# Patient Record
Sex: MALE | Race: WHITE | NOT HISPANIC OR LATINO | Employment: UNEMPLOYED | URBAN - METROPOLITAN AREA
[De-identification: names, ages, dates, MRNs, and addresses within clinical notes are randomized per-mention and may not be internally consistent; named-entity substitution may affect disease eponyms.]

---

## 2018-06-12 ENCOUNTER — HOSPITAL ENCOUNTER (EMERGENCY)
Facility: CLINIC | Age: 28
Discharge: HOME | End: 2018-06-12
Attending: EMERGENCY MEDICINE | Admitting: EMERGENCY MEDICINE
Payer: COMMERCIAL

## 2018-06-12 ENCOUNTER — APPOINTMENT (OUTPATIENT)
Dept: GENERAL RADIOLOGY | Facility: CLINIC | Age: 28
End: 2018-06-12
Attending: EMERGENCY MEDICINE
Payer: COMMERCIAL

## 2018-06-12 VITALS
WEIGHT: 260 LBS | RESPIRATION RATE: 18 BRPM | DIASTOLIC BLOOD PRESSURE: 81 MMHG | SYSTOLIC BLOOD PRESSURE: 152 MMHG | OXYGEN SATURATION: 98 % | BODY MASS INDEX: 35.21 KG/M2 | TEMPERATURE: 98.7 F | HEIGHT: 72 IN

## 2018-06-12 DIAGNOSIS — R07.9 CHEST PAIN, UNSPECIFIED TYPE: ICD-10-CM

## 2018-06-12 DIAGNOSIS — F41.9 ANXIETY: ICD-10-CM

## 2018-06-12 LAB
ANION GAP SERPL CALCULATED.3IONS-SCNC: 6 MMOL/L (ref 3–14)
BASOPHILS # BLD AUTO: 0.1 10E9/L (ref 0–0.2)
BASOPHILS NFR BLD AUTO: 1.4 %
BUN SERPL-MCNC: 15 MG/DL (ref 7–30)
CALCIUM SERPL-MCNC: 8.8 MG/DL (ref 8.5–10.1)
CHLORIDE SERPL-SCNC: 105 MMOL/L (ref 94–109)
CO2 SERPL-SCNC: 25 MMOL/L (ref 20–32)
CREAT SERPL-MCNC: 0.97 MG/DL (ref 0.66–1.25)
D DIMER PPP FEU-MCNC: 0.3 UG/ML FEU (ref 0–0.5)
DIFFERENTIAL METHOD BLD: NORMAL
EOSINOPHIL # BLD AUTO: 0.2 10E9/L (ref 0–0.7)
EOSINOPHIL NFR BLD AUTO: 2.2 %
ERYTHROCYTE [DISTWIDTH] IN BLOOD BY AUTOMATED COUNT: 13.1 % (ref 10–15)
GFR SERPL CREATININE-BSD FRML MDRD: >90 ML/MIN/1.7M2
GLUCOSE SERPL-MCNC: 98 MG/DL (ref 70–99)
HCT VFR BLD AUTO: 45.3 % (ref 40–53)
HGB BLD-MCNC: 15.3 G/DL (ref 13.3–17.7)
IMM GRANULOCYTES # BLD: 0.1 10E9/L (ref 0–0.4)
IMM GRANULOCYTES NFR BLD: 0.6 %
LYMPHOCYTES # BLD AUTO: 2.1 10E9/L (ref 0.8–5.3)
LYMPHOCYTES NFR BLD AUTO: 21.6 %
MCH RBC QN AUTO: 30.5 PG (ref 26.5–33)
MCHC RBC AUTO-ENTMCNC: 33.8 G/DL (ref 31.5–36.5)
MCV RBC AUTO: 90 FL (ref 78–100)
MONOCYTES # BLD AUTO: 0.7 10E9/L (ref 0–1.3)
MONOCYTES NFR BLD AUTO: 6.9 %
NEUTROPHILS # BLD AUTO: 6.4 10E9/L (ref 1.6–8.3)
NEUTROPHILS NFR BLD AUTO: 67.3 %
NRBC # BLD AUTO: 0 10*3/UL
NRBC BLD AUTO-RTO: 0 /100
PLATELET # BLD AUTO: 293 10E9/L (ref 150–450)
POTASSIUM SERPL-SCNC: 4.9 MMOL/L (ref 3.4–5.3)
RBC # BLD AUTO: 5.01 10E12/L (ref 4.4–5.9)
SODIUM SERPL-SCNC: 136 MMOL/L (ref 133–144)
TROPONIN I SERPL-MCNC: <0.015 UG/L (ref 0–0.04)
WBC # BLD AUTO: 9.5 10E9/L (ref 4–11)

## 2018-06-12 PROCEDURE — 25000128 H RX IP 250 OP 636: Performed by: EMERGENCY MEDICINE

## 2018-06-12 PROCEDURE — 96361 HYDRATE IV INFUSION ADD-ON: CPT

## 2018-06-12 PROCEDURE — 99285 EMERGENCY DEPT VISIT HI MDM: CPT | Mod: 25

## 2018-06-12 PROCEDURE — 96374 THER/PROPH/DIAG INJ IV PUSH: CPT

## 2018-06-12 PROCEDURE — 85025 COMPLETE CBC W/AUTO DIFF WBC: CPT | Performed by: EMERGENCY MEDICINE

## 2018-06-12 PROCEDURE — 80048 BASIC METABOLIC PNL TOTAL CA: CPT | Performed by: EMERGENCY MEDICINE

## 2018-06-12 PROCEDURE — 93005 ELECTROCARDIOGRAM TRACING: CPT

## 2018-06-12 PROCEDURE — 85379 FIBRIN DEGRADATION QUANT: CPT | Performed by: EMERGENCY MEDICINE

## 2018-06-12 PROCEDURE — 84484 ASSAY OF TROPONIN QUANT: CPT | Performed by: EMERGENCY MEDICINE

## 2018-06-12 PROCEDURE — 71046 X-RAY EXAM CHEST 2 VIEWS: CPT

## 2018-06-12 RX ORDER — KETOROLAC TROMETHAMINE 30 MG/ML
30 INJECTION, SOLUTION INTRAMUSCULAR; INTRAVENOUS ONCE
Status: COMPLETED | OUTPATIENT
Start: 2018-06-12 | End: 2018-06-12

## 2018-06-12 RX ORDER — ALPRAZOLAM 0.5 MG
0.5 TABLET ORAL 3 TIMES DAILY PRN
Qty: 10 TABLET | Refills: 0 | Status: ON HOLD | OUTPATIENT
Start: 2018-06-12 | End: 2022-06-15

## 2018-06-12 RX ADMIN — SODIUM CHLORIDE 500 ML: 9 INJECTION, SOLUTION INTRAVENOUS at 15:53

## 2018-06-12 RX ADMIN — KETOROLAC TROMETHAMINE 30 MG: 30 INJECTION, SOLUTION INTRAMUSCULAR at 15:53

## 2018-06-12 ASSESSMENT — ENCOUNTER SYMPTOMS
PALPITATIONS: 1
SLEEP DISTURBANCE: 1
COUGH: 1

## 2018-06-12 NOTE — ED TRIAGE NOTES
Pt c/o CP intemittent for 1-2 weeks but worsening over past few days. Increased with deep inhalation. Denies recent travel.  Anxiety and diaphoresis during episodes. Unable to sleep due to pain. ABC in tact. A/OX4

## 2018-06-12 NOTE — ED AVS SNAPSHOT
[facility] Emergency Department    [address]    [facility] [address]    Phone:  [phone]    Fax:  [phone]                                       Marshall Yoon   MRN: [id number]    Department:  [facility] Emergency Department   Date of Visit:  6/12/2018           After Visit Summary Signature Page     I have received my discharge instructions, and my questions have been answered. I have discussed any challenges I see with this plan with the nurse or doctor.    ..........................................................................................................................................  Patient/Patient Representative Signature      ..........................................................................................................................................  Patient Representative Print Name and Relationship to Patient    ..................................................               ................................................  Date                                            Time    ..........................................................................................................................................  Reviewed by Signature/Title    ...................................................              ..............................................  Date                                                            Time

## 2018-06-12 NOTE — ED AVS SNAPSHOT
[facility] Emergency Department    [address] [place] [address]    Phone:  [phone]    Fax:  [phone]                                       Marshall Yoon   MRN: [id number]    Department:  [facility] Emergency Department   Date of Visit:  6/12/2018           Patient Information     Date Of Birth          1990        Your diagnoses for this visit were:     Chest pain, unspecified type     Anxiety        You were seen by Chelo Espinosa MD.      Follow-up Information     Follow up with [facility] (OP).    Contact information:    [address] [place] [address]  [phone]      Discharge References/Attachments     ANXIETY REACTION (ENGLISH)    CHEST PAIN, NONCARDIAC  (ENGLISH)      24 Hour Appointment Hotline       To make an appointment at any [facility] clinic, call [phone] ([phone]). If you don't have a family doctor or clinic, we will help you find one. [facility] clinics are conveniently located to serve the needs of you and your family.             Review of your medicines      START taking        Dose / Directions Last dose taken    ALPRAZolam 0.5 MG tablet   Commonly known as:  XANAX   Dose:  0.5 mg   Quantity:  10 tablet        Take 1 tablet (0.5 mg) by mouth 3 times daily as needed for anxiety   Refills:  0                Prescriptions were sent or printed at these locations (1 Prescription)                   Other Prescriptions                Printed at Department/Unit printer (1 of 1)         ALPRAZolam (XANAX) 0.5 MG tablet                Procedures and tests performed during your visit     Basic metabolic panel    CBC with platelets differential    D dimer quantitative    EKG 12 lead    Peripheral IV catheter    Troponin I    XR Chest 2 Views      Orders Needing Specimen Collection     None      Pending Results     Date and Time Order Name Status Description    6/12/2018 1536 EKG 12 lead Preliminary              Pending Culture Results     No orders found from 6/10/2018 to 6/13/2018.            Pending Results Instructions     If you had any lab results that were not finalized at the time of your Discharge, you can call the ED Lab Result RN at [phone]. You will be contacted by this team for any positive Lab results or changes in treatment. The nurses are available 7 days a week from 10A to 6:30P.  You can leave a message 24 hours per day and they will return your call.        Test Results From Your Hospital Stay        6/12/2018  3:47 PM      Component Results     Component Value Ref Range & Units Status    WBC 9.5 4.0 - 11.0 10e9/L Final    RBC Count 5.01 4.4 - 5.9 10e12/L Final    Hemoglobin 15.3 13.3 - 17.7 g/dL Final    Hematocrit 45.3 40.0 - 53.0 % Final    MCV 90 78 - 100 fl Final    MCH 30.5 26.5 - 33.0 pg Final    MCHC 33.8 31.5 - 36.5 g/dL Final    RDW 13.1 10.0 - 15.0 % Final    Platelet Count 293 150 - 450 10e9/L Final    Diff Method Automated Method  Final    % Neutrophils 67.3 % Final    % Lymphocytes 21.6 % Final    % Monocytes 6.9 % Final    % Eosinophils 2.2 % Final    % Basophils 1.4 % Final    % Immature Granulocytes 0.6 % Final    Nucleated RBCs 0 0 /100 Final    Absolute Neutrophil 6.4 1.6 - 8.3 10e9/L Final    Absolute Lymphocytes 2.1 0.8 - 5.3 10e9/L Final    Absolute Monocytes 0.7 0.0 - 1.3 10e9/L Final    Absolute Eosinophils 0.2 0.0 - 0.7 10e9/L Final    Absolute Basophils 0.1 0.0 - 0.2 10e9/L Final    Abs Immature Granulocytes 0.1 0 - 0.4 10e9/L Final    Absolute Nucleated RBC 0.0  Final         6/12/2018  4:05 PM      Component Results     Component Value Ref Range & Units Status    Sodium 136 133 - 144 mmol/L Final    Potassium 4.9 3.4 - 5.3 mmol/L Final    Chloride 105 94 - 109 mmol/L Final    Carbon Dioxide 25 20 - 32 mmol/L Final    Anion Gap 6 3 - 14 mmol/L Final    Glucose 98 70 - 99 mg/dL Final    Urea Nitrogen 15 7 - 30 mg/dL Final    Creatinine 0.97 0.66 - 1.25 mg/dL Final     GFR Estimate >90 >60 mL/min/1.7m2 Final    Non  GFR Calc    GFR Estimate If Black >90 >60 mL/min/1.7m2 Final    African American GFR Calc    Calcium 8.8 8.5 - 10.1 mg/dL Final         6/12/2018  3:59 PM      Component Results     Component Value Ref Range & Units Status    D Dimer 0.3 0.0 - 0.50 ug/ml FEU Final    This D-dimer assay is intended for use in conjunction with a clinical pretest   probability assessment model to exclude pulmonary embolism (PE) and deep   venous thrombosis (DVT) in outpatients suspected of PE or DVT. The cut-off   value is 0.5 ug/mL FEU.           6/12/2018  4:05 PM      Component Results     Component Value Ref Range & Units Status    Troponin I ES <0.015 0.000 - 0.045 ug/L Final    The 99th percentile for upper reference range is 0.045 ug/L.  Troponin values   in the range of 0.045 - 0.120 ug/L may be associated with risks of adverse   clinical events.           6/12/2018  4:39 PM      Narrative     CHEST TWO VIEWS  6/12/2018 4:33 PM     HISTORY: chest pain;     COMPARISON: None.        Impression     IMPRESSION:   Heart and pulmonary vessels within normal limits. Lungs clear. No  pleural effusion.    LING BAEZ MD                Clinical Quality Measure: Blood Pressure Screening     Your blood pressure was checked while you were in the emergency department today. The last reading we obtained was  BP: (!) 135/102 . Please read the guidelines below about what these numbers mean and what you should do about them.  If your systolic blood pressure (the top number) is less than 120 and your diastolic blood pressure (the bottom number) is less than 80, then your blood pressure is normal. There is nothing more that you need to do about it.  If your systolic blood pressure (the top number) is 120-139 or your diastolic blood pressure (the bottom number) is 80-89, your blood pressure may be higher than it should be. You should have your blood pressure rechecked within a year  "by a primary care provider.  If your systolic blood pressure (the top number) is 140 or greater or your diastolic blood pressure (the bottom number) is 90 or greater, you may have high blood pressure. High blood pressure is treatable, but if left untreated over time it can put you at risk for heart attack, stroke, or kidney failure. You should have your blood pressure rechecked by a primary care provider within the next 4 weeks.  If your provider in the emergency department today gave you specific instructions to follow-up with your doctor or provider even sooner than that, you should follow that instruction and not wait for up to 4 weeks for your follow-up visit.        Thank you for choosing [facility]       Thank you for choosing [facility] for your care. Our goal is always to provide you with excellent care. Hearing back from our patients is one way we can continue to improve our services. Please take a few minutes to complete the written survey that you may receive in the mail after you visit with us. Thank you!        [employer] Information     [employer] lets you send messages to your doctor, view your test results, renew your prescriptions, schedule appointments and more. To sign up, go to [email or web address] . Click on \"Log in\" on the left side of the screen, which will take you to the Welcome page. Then click on \"Sign up Now\" on the right side of the page.     You will be asked to enter the access code listed below, as well as some personal information. Please follow the directions to create your username and password.     Your access code is: [email or web address]  Expires: 9/10/2018  5:35 PM     Your access code will  in 90 days. If you need help or a new code, please call your [facility] clinic or [phone].        Care EveryWhere ID     This is your Care EveryWhere ID. This could be used by other organizations to access your [facility] medical records  [id number]        Equal Access to Services     PRINCE BELLE " AH: Sara Echevarria, waclaire lualmaadaha, qajozefta kasusan vergara. So [facility] [phone].    ATENCIÓN: Si habla español, tiene a hutson disposición servicios gratuitos de asistencia lingüística. Llame al [phone].    We comply with applicable federal civil rights laws and [place] laws. We do not discriminate on the basis of race, color, national origin, age, disability, sex, sexual orientation, or gender identity.            After Visit Summary       This is your record. Keep this with you and show to your community pharmacist(s) and doctor(s) at your next visit.

## 2018-06-12 NOTE — ED PROVIDER NOTES
History     Chief Complaint:  Chest Pain    HPI   Marshall Yoon is a 27 year old male with anxiety who presents with left sided chest pain that sometimes moves to the right side. The patient states that he has had chest pain for about a week. The patient states the pain is currently a 7. The patient says that taking deep breath that it makes the pain worse. The patient says he does not have a persistent cough, but when he does cough it seems to increase the pain. He also notes pain when the left side of the chest is palpated.The patient notes that he did not sleep well last night as he had heart palpitations.The patient states that he has no heavy lifting, no recent travel, and no surgery. He does not that he had a procedure on his arm after it became infected after drug use. Of note: the patient states that his dad has a stint.     Allergies:  No Known Drug Allergies    Medications:    Medications reviewed. No current medications.     Past Medical History:    Anxiety  Recreational drug use     Past Surgical History:    Right arm I and D    Family History:    Family history reviewed. No pertinent family history.     Social History:  Smoking Status: Current Every day smoker    Packs/day: 0.25  Smokeless Tobacco: Never Used  Alcohol Use: No       Review of Systems   Respiratory: Positive for cough.    Cardiovascular: Positive for chest pain and palpitations.   Psychiatric/Behavioral: Positive for sleep disturbance.        Anxiety.    All other systems reviewed and are negative.  Pt c/o CP intemittent for 1-2 weeks but worsening over past few days. Increased with deep inhalation. Denies recent travel.  Anxiety and diaphoresis during episodes. Unable to sleep due to pain  Physical Exam     Patient Vitals for the past 24 hrs:   BP Temp Temp src Heart Rate Resp SpO2 Height Weight   06/12/18 1615 (!) 135/102 - - 89 - 97 % - -   06/12/18 1600 (!) 142/97 - - 86 - 99 % - -   06/12/18 1545 (!) 147/94 - - 92 - 97 % - -    06/12/18 1533 (!) 137/126 - - - - - - -   06/12/18 1530 - - - 91 - 100 % - -   06/12/18 1529 - 98.7  F (37.1  C) Oral 96 20 100 % 1.829 m (6') 117.9 kg (260 lb)       Physical Exam  GEN: patient smiling, no distress  HEAD: atraumatic, normocephalic  EYES: pupils reactive,  conjunctivae normal  ENT: TMs flat and white bilaterally, oropharynx normal with no erythema or exudate, mucus membranes moist  NECK: no cervical LAD  RESPIRATORY: no tachypnea, breath sounds clear to auscultation (no rales, wheezes, rhonchi), chest wall nontender, normal phonation  CVS: normal S1/S2, no murmurs/rubs/gallops  ABDOMEN: soft, nontender, no masses or organomegaly, no rebound, positive bowel sounds  BACK: no costovertebral angle tenderness  EXTREMITIES: intact pulses x 4, full range of motion at joints, no edema, negative Pieter's sign  MUSCULOSKELETAL: no deformities  SKIN: warm and dry  NEURO: GCS 15, cranial nerves intact.  Motor- moves all 4 extremities with 5/5 strength.  Sensation- intact.  Coordination- ambulatory.  Overall symmetrical exam  HEME: no bruising or petechiae/contusions  LYMPH: no lymphadenopathy    Emergency Department Course     ECG:  ECG taken at 1530, ECG read at 1530  Normal sinus rhythm  Normal ECG  Rate 97 bpm. KY interval 162 ms. QRS duration 82 ms. QT/QTc 338/429 ms. P-R-T axes 44 -8 24.  Normal axis.  No STEMI    Imaging:  Radiology findings were communicated with the patient who voiced understanding of the findings.    XR Chest   Heart and pulmonary vessels within normal limits. Lungs clear. No  pleural effusion.  LING BAEZ MD  Reading per radiology    Laboratory:  Laboratory findings were communicated with the patient  who voiced understanding of the findings.    CBC: WBC 5.01, HGB 15.3,   BMP:  WNL (Creatinine 0.97)  D dimer: 0.3 (normal)  Troponin (Collected 1536): <0.015    Interventions:  1553  mL IV  1553 Toradol 30 mg IV  He[place]ock  Cardiac/Sp02 monitoring    Emergency Department  Course:    1525 Nursing notes and vitals reviewed.    1530 I performed an exam of the patient as documented above.     1536 IV was inserted and blood was drawn for laboratory testing, results above.    1628 The patient was sent for a XR Chest while in the emergency department, results above.      1716  I rechecked on the patient and updated the patient on the results.     1803 I personally reviewed the laboratory and imaging results with the patient and answered all related questions prior to discharge.    /81  Temp 98.7  F (37.1  C) (Oral)  Resp 18  Ht 1.829 m (6')  Wt 117.9 kg (260 lb)  SpO2 98%  BMI 35.26 kg/m2  Discussed results with patient.  Gave patient copies of results (applicable labs, CT scans and/or ultrasound).  Answered questions.  Asked patient to followup with PCP.       Impression & Plan      Medical Decision Making:  Marshall Yoon is a 27 year old male who presents to the emergency department today for evaluation of chest pain. The patient was brought back immediately in case this was a cardiac issue.  The patient does not hve a history of cardiac disorder.  Differential includes, but is not limited to, ACS, PE, pneumonia, pneumothorax, aortic dissection. esophageal rupture, pericarditis, GERD, or musculoskeletal chest wall pain. An EKG was obtained and demonstrates sinus rhythm. There are no concerning acute ischemic changes.  EKG did not show ST elevation myocardial infarction. Chest x-ray showed no evidence of an infiltrate or a pneumothorax. There was no widened mediastinum to suggest aortic dissection (ie no evidence of an acute cardiopulmonary process)  The initial D dimer was negative.  Initial troponin does not show any sign of ischemia.    Patient was placed on cardiac monitor and given oxygen by nasal cannula.     The patient feels completelt better after receiving toradol IV. In the past the patient has been on buspar and gabapentin for anxiety, but he does not have a  primary care physician and did not like the effects of these medicines. The plan at this point is to give him xanax and have him follow up with a primary care physician.     Given the patient's unremarkable imaging- I feel that pneumonia, pneumothorax, aortic dissection and PE are less likely. The patient's EKG is unremarkable.  I feel that ACS is less likely with a negative troponin x 1. Overall, the workup here is relativity unremarkable. Given that the patient appears well with an unremarkable workup, I do feel that the patient can be discharged to home.      Plan for followup with PCP.      Diagnosis:    ICD-10-CM    1. Chest pain, unspecified type R07.9    2. Anxiety F41.9      Disposition:   The patient is discharged to home.    Discharge Medications:  New Prescriptions    ALPRAZOLAM (XANAX) 0.5 MG TABLET    Take 1 tablet (0.5 mg) by mouth 3 times daily as needed for anxiety     Scribe Disclosure:  I, Anna Westion, am serving as a scribe at 3:37 PM on 6/12/2018 to document services personally performed by Chelo Espinosa MD based on my observations and the provider's statements to me.  [facility] EMERGENCY DEPARTMENT       Chelo Espinosa MD  06/13/18 1248       Chelo Espinosa MD  06/13/18 6951

## 2018-06-13 LAB — INTERPRETATION ECG - MUSE: NORMAL

## 2022-01-12 ENCOUNTER — APPOINTMENT (OUTPATIENT)
Dept: GENERAL RADIOLOGY | Facility: CLINIC | Age: 32
DRG: 158 | End: 2022-01-12
Attending: PHYSICIAN ASSISTANT
Payer: COMMERCIAL

## 2022-01-12 ENCOUNTER — APPOINTMENT (OUTPATIENT)
Dept: CT IMAGING | Facility: CLINIC | Age: 32
DRG: 158 | End: 2022-01-12
Attending: EMERGENCY MEDICINE
Payer: COMMERCIAL

## 2022-01-12 ENCOUNTER — ANESTHESIA (OUTPATIENT)
Dept: SURGERY | Facility: CLINIC | Age: 32
DRG: 158 | End: 2022-01-12
Payer: COMMERCIAL

## 2022-01-12 ENCOUNTER — ANESTHESIA EVENT (OUTPATIENT)
Dept: SURGERY | Facility: CLINIC | Age: 32
DRG: 158 | End: 2022-01-12
Payer: COMMERCIAL

## 2022-01-12 ENCOUNTER — HOSPITAL ENCOUNTER (INPATIENT)
Facility: CLINIC | Age: 32
LOS: 2 days | Discharge: HOME | DRG: 158 | End: 2022-01-14
Attending: EMERGENCY MEDICINE | Admitting: PEDIATRICS
Payer: COMMERCIAL

## 2022-01-12 DIAGNOSIS — M27.2 ABSCESS OF MANDIBLE: ICD-10-CM

## 2022-01-12 DIAGNOSIS — F17.200 TOBACCO USE DISORDER: ICD-10-CM

## 2022-01-12 DIAGNOSIS — Z11.52 ENCOUNTER FOR SCREENING LABORATORY TESTING FOR SEVERE ACUTE RESPIRATORY SYNDROME CORONAVIRUS 2 (SARS-COV-2): ICD-10-CM

## 2022-01-12 LAB
ANION GAP SERPL CALCULATED.3IONS-SCNC: 5 MMOL/L (ref 3–14)
BASOPHILS # BLD AUTO: 0.1 10E3/UL (ref 0–0.2)
BASOPHILS NFR BLD AUTO: 1 %
BUN SERPL-MCNC: 20 MG/DL (ref 7–30)
CALCIUM SERPL-MCNC: 8.8 MG/DL (ref 8.5–10.1)
CHLORIDE BLD-SCNC: 110 MMOL/L (ref 94–109)
CO2 SERPL-SCNC: 23 MMOL/L (ref 20–32)
CREAT SERPL-MCNC: 0.86 MG/DL (ref 0.66–1.25)
CRP SERPL-MCNC: 220 MG/L (ref 0–8)
EOSINOPHIL # BLD AUTO: 0.1 10E3/UL (ref 0–0.7)
EOSINOPHIL NFR BLD AUTO: 1 %
ERYTHROCYTE [DISTWIDTH] IN BLOOD BY AUTOMATED COUNT: 12.8 % (ref 10–15)
ERYTHROCYTE [SEDIMENTATION RATE] IN BLOOD BY WESTERGREN METHOD: 45 MM/HR (ref 0–15)
GFR SERPL CREATININE-BSD FRML MDRD: >90 ML/MIN/1.73M2
GLUCOSE BLD-MCNC: 112 MG/DL (ref 70–99)
HCT VFR BLD AUTO: 37.2 % (ref 40–53)
HGB BLD-MCNC: 12.1 G/DL (ref 13.3–17.7)
HOLD SPECIMEN: NORMAL
HOLD SPECIMEN: NORMAL
IMM GRANULOCYTES # BLD: 0.1 10E3/UL
IMM GRANULOCYTES NFR BLD: 0 %
LYMPHOCYTES # BLD AUTO: 1 10E3/UL (ref 0.8–5.3)
LYMPHOCYTES NFR BLD AUTO: 7 %
MCH RBC QN AUTO: 31.2 PG (ref 26.5–33)
MCHC RBC AUTO-ENTMCNC: 32.5 G/DL (ref 31.5–36.5)
MCV RBC AUTO: 96 FL (ref 78–100)
MONOCYTES # BLD AUTO: 1.2 10E3/UL (ref 0–1.3)
MONOCYTES NFR BLD AUTO: 8 %
NEUTROPHILS # BLD AUTO: 12.5 10E3/UL (ref 1.6–8.3)
NEUTROPHILS NFR BLD AUTO: 83 %
NRBC # BLD AUTO: 0 10E3/UL
NRBC BLD AUTO-RTO: 0 /100
PLATELET # BLD AUTO: 343 10E3/UL (ref 150–450)
POTASSIUM BLD-SCNC: 4.7 MMOL/L (ref 3.4–5.3)
RADIOLOGIST FLAGS: ABNORMAL
RBC # BLD AUTO: 3.88 10E6/UL (ref 4.4–5.9)
SARS-COV-2 RNA RESP QL NAA+PROBE: NEGATIVE
SODIUM SERPL-SCNC: 138 MMOL/L (ref 133–144)
WBC # BLD AUTO: 14.9 10E3/UL (ref 4–11)

## 2022-01-12 PROCEDURE — 96365 THER/PROPH/DIAG IV INF INIT: CPT | Mod: 59 | Performed by: EMERGENCY MEDICINE

## 2022-01-12 PROCEDURE — 120N000002 HC R&B MED SURG/OB UMMC

## 2022-01-12 PROCEDURE — 99207 PR APP CREDIT; MD BILLING SHARED VISIT: CPT | Performed by: PEDIATRICS

## 2022-01-12 PROCEDURE — 96375 TX/PRO/DX INJ NEW DRUG ADDON: CPT | Performed by: EMERGENCY MEDICINE

## 2022-01-12 PROCEDURE — 86140 C-REACTIVE PROTEIN: CPT | Performed by: EMERGENCY MEDICINE

## 2022-01-12 PROCEDURE — 250N000011 HC RX IP 250 OP 636: Performed by: EMERGENCY MEDICINE

## 2022-01-12 PROCEDURE — 258N000003 HC RX IP 258 OP 636: Performed by: EMERGENCY MEDICINE

## 2022-01-12 PROCEDURE — U0005 INFEC AGEN DETEC AMPLI PROBE: HCPCS | Performed by: PHYSICIAN ASSISTANT

## 2022-01-12 PROCEDURE — 96366 THER/PROPH/DIAG IV INF ADDON: CPT | Performed by: EMERGENCY MEDICINE

## 2022-01-12 PROCEDURE — 85048 AUTOMATED LEUKOCYTE COUNT: CPT | Performed by: EMERGENCY MEDICINE

## 2022-01-12 PROCEDURE — 36415 COLL VENOUS BLD VENIPUNCTURE: CPT | Performed by: EMERGENCY MEDICINE

## 2022-01-12 PROCEDURE — 71046 X-RAY EXAM CHEST 2 VIEWS: CPT

## 2022-01-12 PROCEDURE — 96361 HYDRATE IV INFUSION ADD-ON: CPT | Performed by: EMERGENCY MEDICINE

## 2022-01-12 PROCEDURE — 85652 RBC SED RATE AUTOMATED: CPT | Performed by: EMERGENCY MEDICINE

## 2022-01-12 PROCEDURE — 70491 CT SOFT TISSUE NECK W/DYE: CPT

## 2022-01-12 PROCEDURE — 71046 X-RAY EXAM CHEST 2 VIEWS: CPT | Mod: 26

## 2022-01-12 PROCEDURE — 99223 1ST HOSP IP/OBS HIGH 75: CPT | Mod: AI | Performed by: PHYSICIAN ASSISTANT

## 2022-01-12 PROCEDURE — 87149 DNA/RNA DIRECT PROBE: CPT | Performed by: EMERGENCY MEDICINE

## 2022-01-12 PROCEDURE — 250N000013 HC RX MED GY IP 250 OP 250 PS 637: Performed by: PHYSICIAN ASSISTANT

## 2022-01-12 PROCEDURE — 96367 TX/PROPH/DG ADDL SEQ IV INF: CPT | Performed by: EMERGENCY MEDICINE

## 2022-01-12 PROCEDURE — 250N000009 HC RX 250: Performed by: EMERGENCY MEDICINE

## 2022-01-12 PROCEDURE — 99285 EMERGENCY DEPT VISIT HI MDM: CPT | Performed by: EMERGENCY MEDICINE

## 2022-01-12 PROCEDURE — 70491 CT SOFT TISSUE NECK W/DYE: CPT | Mod: 26 | Performed by: STUDENT IN AN ORGANIZED HEALTH CARE EDUCATION/TRAINING PROGRAM

## 2022-01-12 PROCEDURE — 80048 BASIC METABOLIC PNL TOTAL CA: CPT | Performed by: EMERGENCY MEDICINE

## 2022-01-12 PROCEDURE — 250N000011 HC RX IP 250 OP 636: Performed by: PHYSICIAN ASSISTANT

## 2022-01-12 PROCEDURE — 99285 EMERGENCY DEPT VISIT HI MDM: CPT | Mod: 25 | Performed by: EMERGENCY MEDICINE

## 2022-01-12 PROCEDURE — C9803 HOPD COVID-19 SPEC COLLECT: HCPCS | Performed by: EMERGENCY MEDICINE

## 2022-01-12 PROCEDURE — 87389 HIV-1 AG W/HIV-1&-2 AB AG IA: CPT | Performed by: PHYSICIAN ASSISTANT

## 2022-01-12 PROCEDURE — 96376 TX/PRO/DX INJ SAME DRUG ADON: CPT | Performed by: EMERGENCY MEDICINE

## 2022-01-12 PROCEDURE — 87181 SC STD AGAR DILUTION PER AGT: CPT | Performed by: EMERGENCY MEDICINE

## 2022-01-12 RX ORDER — ONDANSETRON 4 MG/1
4 TABLET, ORALLY DISINTEGRATING ORAL EVERY 6 HOURS PRN
Status: DISCONTINUED | OUTPATIENT
Start: 2022-01-12 | End: 2022-01-14 | Stop reason: HOSPADM

## 2022-01-12 RX ORDER — HYDROMORPHONE HYDROCHLORIDE 1 MG/ML
0.5 INJECTION, SOLUTION INTRAMUSCULAR; INTRAVENOUS; SUBCUTANEOUS ONCE
Status: COMPLETED | OUTPATIENT
Start: 2022-01-12 | End: 2022-01-12

## 2022-01-12 RX ORDER — DEXAMETHASONE 2 MG/1
6 TABLET ORAL EVERY 6 HOURS SCHEDULED
Status: DISCONTINUED | OUTPATIENT
Start: 2022-01-12 | End: 2022-01-12

## 2022-01-12 RX ORDER — SODIUM CHLORIDE 9 MG/ML
INJECTION, SOLUTION INTRAVENOUS CONTINUOUS
Status: DISCONTINUED | OUTPATIENT
Start: 2022-01-12 | End: 2022-01-14 | Stop reason: HOSPADM

## 2022-01-12 RX ORDER — ONDANSETRON 2 MG/ML
4 INJECTION INTRAMUSCULAR; INTRAVENOUS EVERY 6 HOURS PRN
Status: DISCONTINUED | OUTPATIENT
Start: 2022-01-12 | End: 2022-01-14 | Stop reason: HOSPADM

## 2022-01-12 RX ORDER — IOPAMIDOL 755 MG/ML
100 INJECTION, SOLUTION INTRAVASCULAR ONCE
Status: COMPLETED | OUTPATIENT
Start: 2022-01-12 | End: 2022-01-12

## 2022-01-12 RX ORDER — ACETAMINOPHEN 325 MG/1
975 TABLET ORAL EVERY 6 HOURS
Status: DISCONTINUED | OUTPATIENT
Start: 2022-01-12 | End: 2022-01-14 | Stop reason: HOSPADM

## 2022-01-12 RX ORDER — NALOXONE HYDROCHLORIDE 0.4 MG/ML
0.2 INJECTION, SOLUTION INTRAMUSCULAR; INTRAVENOUS; SUBCUTANEOUS
Status: DISCONTINUED | OUTPATIENT
Start: 2022-01-12 | End: 2022-01-14 | Stop reason: HOSPADM

## 2022-01-12 RX ORDER — DEXAMETHASONE SODIUM PHOSPHATE 10 MG/ML
6 INJECTION, SOLUTION INTRAMUSCULAR; INTRAVENOUS EVERY 6 HOURS
Status: DISCONTINUED | OUTPATIENT
Start: 2022-01-12 | End: 2022-01-13

## 2022-01-12 RX ORDER — OXYCODONE HYDROCHLORIDE 5 MG/1
5 TABLET ORAL EVERY 4 HOURS PRN
Status: DISCONTINUED | OUTPATIENT
Start: 2022-01-12 | End: 2022-01-14 | Stop reason: HOSPADM

## 2022-01-12 RX ORDER — KETOROLAC TROMETHAMINE 30 MG/ML
30 INJECTION, SOLUTION INTRAMUSCULAR; INTRAVENOUS EVERY 6 HOURS PRN
Status: DISCONTINUED | OUTPATIENT
Start: 2022-01-12 | End: 2022-01-13

## 2022-01-12 RX ORDER — LIDOCAINE 40 MG/G
CREAM TOPICAL
Status: DISCONTINUED | OUTPATIENT
Start: 2022-01-12 | End: 2022-01-14 | Stop reason: HOSPADM

## 2022-01-12 RX ORDER — POLYETHYLENE GLYCOL 3350 17 G/17G
17 POWDER, FOR SOLUTION ORAL DAILY
Status: DISCONTINUED | OUTPATIENT
Start: 2022-01-13 | End: 2022-01-14 | Stop reason: HOSPADM

## 2022-01-12 RX ORDER — AMPICILLIN AND SULBACTAM 2; 1 G/1; G/1
3 INJECTION, POWDER, FOR SOLUTION INTRAMUSCULAR; INTRAVENOUS EVERY 6 HOURS
Status: DISCONTINUED | OUTPATIENT
Start: 2022-01-12 | End: 2022-01-13

## 2022-01-12 RX ORDER — LORAZEPAM 2 MG/ML
0.5 INJECTION INTRAMUSCULAR ONCE
Status: COMPLETED | OUTPATIENT
Start: 2022-01-12 | End: 2022-01-12

## 2022-01-12 RX ORDER — CLINDAMYCIN PHOSPHATE 900 MG/50ML
900 INJECTION, SOLUTION INTRAVENOUS ONCE
Status: COMPLETED | OUTPATIENT
Start: 2022-01-12 | End: 2022-01-12

## 2022-01-12 RX ORDER — NALOXONE HYDROCHLORIDE 0.4 MG/ML
0.4 INJECTION, SOLUTION INTRAMUSCULAR; INTRAVENOUS; SUBCUTANEOUS
Status: DISCONTINUED | OUTPATIENT
Start: 2022-01-12 | End: 2022-01-14 | Stop reason: HOSPADM

## 2022-01-12 RX ORDER — HYDROMORPHONE HYDROCHLORIDE 1 MG/ML
.3-.5 INJECTION, SOLUTION INTRAMUSCULAR; INTRAVENOUS; SUBCUTANEOUS
Status: DISCONTINUED | OUTPATIENT
Start: 2022-01-12 | End: 2022-01-14 | Stop reason: HOSPADM

## 2022-01-12 RX ADMIN — OXYCODONE HYDROCHLORIDE 5 MG: 5 TABLET ORAL at 23:49

## 2022-01-12 RX ADMIN — LORAZEPAM 0.5 MG: 2 INJECTION INTRAMUSCULAR; INTRAVENOUS at 21:31

## 2022-01-12 RX ADMIN — CLINDAMYCIN IN 5 PERCENT DEXTROSE 900 MG: 18 INJECTION, SOLUTION INTRAVENOUS at 14:45

## 2022-01-12 RX ADMIN — DEXAMETHASONE SODIUM PHOSPHATE 6 MG: 10 INJECTION, SOLUTION INTRAMUSCULAR; INTRAVENOUS at 18:07

## 2022-01-12 RX ADMIN — SODIUM CHLORIDE: 9 INJECTION, SOLUTION INTRAVENOUS at 17:40

## 2022-01-12 RX ADMIN — HYDROMORPHONE HYDROCHLORIDE 0.5 MG: 1 INJECTION, SOLUTION INTRAMUSCULAR; INTRAVENOUS; SUBCUTANEOUS at 17:33

## 2022-01-12 RX ADMIN — HYDROMORPHONE HYDROCHLORIDE 0.5 MG: 1 INJECTION, SOLUTION INTRAMUSCULAR; INTRAVENOUS; SUBCUTANEOUS at 15:22

## 2022-01-12 RX ADMIN — KETOROLAC TROMETHAMINE 30 MG: 30 INJECTION, SOLUTION INTRAMUSCULAR; INTRAVENOUS at 21:31

## 2022-01-12 RX ADMIN — HYDROMORPHONE HYDROCHLORIDE 0.5 MG: 1 INJECTION, SOLUTION INTRAMUSCULAR; INTRAVENOUS; SUBCUTANEOUS at 21:25

## 2022-01-12 RX ADMIN — SODIUM CHLORIDE 1000 ML: 9 INJECTION, SOLUTION INTRAVENOUS at 13:07

## 2022-01-12 RX ADMIN — IOPAMIDOL 100 ML: 755 INJECTION, SOLUTION INTRAVENOUS at 13:30

## 2022-01-12 RX ADMIN — SODIUM CHLORIDE, PRESERVATIVE FREE 60 ML: 5 INJECTION INTRAVENOUS at 13:30

## 2022-01-12 RX ADMIN — AMPICILLIN SODIUM AND SULBACTAM SODIUM 3 G: 2; 1 INJECTION, POWDER, FOR SOLUTION INTRAMUSCULAR; INTRAVENOUS at 21:24

## 2022-01-12 ASSESSMENT — ACTIVITIES OF DAILY LIVING (ADL)
ADLS_ACUITY_SCORE: 7

## 2022-01-12 ASSESSMENT — ENCOUNTER SYMPTOMS
SHORTNESS OF BREATH: 0
TROUBLE SWALLOWING: 0
FACIAL SWELLING: 1
FEVER: 0

## 2022-01-12 NOTE — ED PROVIDER NOTES
ED Provider Note  [facility]      History     Chief Complaint   Patient presents with     Facial Swelling     HPI  Marshall Yoon is a 31 year old male with history of polysubstance abuse, substance-induced psychosis presenting to the ED with left sided facial swelling in the setting of recently diagnosed dental abscess.   He was started on oral clindamycin yesterday after CT scan but reports that swelling is worsened and when he woke up this morning he did feel like he was having some difficulty breathing.  That has improved.  He denies difficulty swallowing.  He denies fevers.  He was set up to follow-up in the dental clinic on Friday but because he is progressively worsening presented to the emergency department.    Patient seen in [facility] 1/10 for left jaw swelling. WBC 14.4 and he was given IV Clindamycin. CT scan showed a 4 cm fluid collection along the lingual margin of the body of the left mandible, questionable abscess versus cavity. Other primary consideration would be an obstructed salivary duct along the floor of the mouth and subsequent infection. Extensive soft tissue inflammation and edema including left pharynx, parapharyngeal, , and submandibular spaces. Pt does not have a PCP did not have a ride. Attempted to call [facility], the [facility], and [facility] to see if there is a dental resident who can see him in clinic tomorrow. Patient to see [facility] ([facility] clinic) today. Advised to call the clinic [phone] at 8am stating that they were seen at the  and case discussed with the on call dental resident (Dr Dane Bonner). Pt discharged with prescriptions for Clindamycin and Norco.       CT FACIAL BONES 1/11/22  IMPRESSION: 1. 4 cm fluid collection along the lingual margin of the body of the left mandible. Favor that this reflects an abscess related to cavity of tooth #18. 2. The other primary consideration would be an obstructive salivary duct  along the floor of mouth and subsequent infection/inflammation of the submandibular gland, but the morphology of fluid collection is not typical for this. 3. Extensive inflammatory soft tissue stranding and edema affects the left pharynx, parapharyngeal, , submandibular and submental spaces.         Past Medical History  Past Medical History:   Diagnosis Date     Anxiety      No past surgical history on file.  ALPRAZolam (XANAX) 0.5 MG tablet      No Known Allergies  Family History  No family history on file.  Social History   Social History     Tobacco Use     Smoking status: Current Every Day Smoker     Packs/day: 0.25     Smokeless tobacco: Never Used   Substance Use Topics     Alcohol use: No     Drug use: Not on file      Past medical history, past surgical history, medications, allergies, family history, and social history were reviewed with the patient. No additional pertinent items.       Review of Systems   Constitutional: Negative for fever.   HENT: Positive for facial swelling. Negative for trouble swallowing.    Respiratory: Negative for shortness of breath.    Cardiovascular: Negative for chest pain.   All other systems reviewed and are negative.    A complete review of systems was performed with pertinent positives and negatives noted in the HPI, and all other systems negative.    Physical Exam   BP: (!) 142/97  Pulse: 100  Temp: 98.2  F (36.8  C)  Resp: 18  SpO2: 97 %  Physical Exam  Vitals and nursing note reviewed.   Constitutional:       General: He is not in acute distress.     Appearance: He is well-developed. He is not ill-appearing, toxic-appearing or diaphoretic.      Comments: Patient is awake and alert, he is handling his own secretions without difficulty.  He is phonating normally and maintaining an airway without difficulty.   HENT:      Head: Normocephalic and atraumatic.      Jaw: Tenderness, swelling and pain on movement present. No trismus.        Mouth/Throat:      Lips:  Pink.      Mouth: Mucous membranes are moist.      Pharynx: Oropharynx is clear. No oropharyngeal exudate.   Eyes:      General: Lids are normal. No scleral icterus.     Extraocular Movements: Extraocular movements intact.      Right eye: No nystagmus.      Left eye: No nystagmus.      Conjunctiva/sclera: Conjunctivae normal.      Pupils: Pupils are equal, round, and reactive to light.   Neck:      Thyroid: No thyromegaly.      Vascular: No JVD.      Trachea: No tracheal deviation.   Cardiovascular:      Rate and Rhythm: Normal rate and regular rhythm.      Pulses: Normal pulses.      Heart sounds: Normal heart sounds. No murmur heard.  No friction rub. No gallop.    Pulmonary:      Effort: Pulmonary effort is normal. No respiratory distress.      Breath sounds: Normal breath sounds.   Abdominal:      General: Bowel sounds are normal. There is no distension.      Palpations: Abdomen is soft. There is no mass.      Tenderness: There is no abdominal tenderness. There is no guarding or rebound.   Musculoskeletal:         General: No tenderness. Normal range of motion.      Cervical back: Normal range of motion and neck supple. No erythema or rigidity.      Right lower leg: No edema.      Left lower leg: No edema.   Lymphadenopathy:      Cervical: No cervical adenopathy.   Skin:     General: Skin is warm and dry.      Capillary Refill: Capillary refill takes less than 2 seconds.      Coloration: Skin is not pale.      Findings: No erythema or rash.   Neurological:      Mental Status: He is alert and oriented to person, place, and time.      Cranial Nerves: No cranial nerve deficit.      Sensory: No sensory deficit.      Motor: Motor function is intact.   Psychiatric:         Mood and Affect: Mood and affect normal.         Speech: Speech normal.         Behavior: Behavior normal.         ED Course      Procedures                     Results for orders placed or performed during the hospital encounter of 01/12/22   Soft  tissue neck CT w contrast     Status: Abnormal (Preliminary result)   Result Value Ref Range    Radiologist flags Facial infection/abscess with for possible (Urgent)     Narrative    CT SOFT TISSUE NECK W CONTRAST 1/12/2022 2:03 PM    History:  Odontogenic infection with submental spread      Comparison:  Outside CT 1/11/2022.     Technique: Following intravenous administration of nonionic iodinated  contrast medium, thin section helical CT images were obtained from the  skull base down to the level of the aortic arch.  Axial, coronal and  sagittal reformations were performed with 2-3 mm slice thickness  reconstruction. Images were reviewed in soft tissue, lung and bone  windows.    Contrast: 109 mL Isovue 370 mg/ml    Findings:   Subperiosteal abscess extending along the inferior margin of the body  of the left mandible which appears to extend into the left platysma  muscle with a 2.0 x 1.3 cm intramuscular component (series 2 image  54).     The retropharyngeal space appears enlarged containing hypoattenuating  fluid through the level of C4. There is extensive edema throughout the  left submandibular, , and buccal spaces with enlargement of  the left submandibular glands, masseter muscle medial pterygoid  muscle, and left pharyngeal mucosa. No additional abscess is seen.  Inflammatory changes track down the anterior neck midline extending  through the visualized paramedian anterior chest.    Evaluation of remaining mucosal spaces demonstrates no abnormal mass  or enhancement. The thyroid gland is unremarkable.     There are numerous enlarged lymph nodes in the left neck without  definite necrosis. The major cervical vasculature appears patent.    Periapical dental disease of the left second and third mandibular  molars, likely the cause of above. No additional suspicious bone  lesions. Paranasal sinuses and the mastoid air cells are clear.    The lung apices are clear.      Impression    Impression:  1.  Likely odontogenic subperiosteal abscess extending along the left  body of the mandible and into the left platysma muscle. Adjacent  inflammation/infection extends into the left submandibular ,  buccal spaces without definite additional abnormal collection.  2. Edematous enlargement of the retropharyngeal space. Prevertebral  extension of infection cannot be excluded.  3. Periapical abscesses of the left third and second molars, likely  the etiology of abscess above.    [Urgent Result: Facial infection/abscess with for possible  retropharyngeal involvement]    Finding was identified on 1/12/2022 2:11 PM.     Dr. Elliott was contacted by Dr. Skinner at 1/12/2022 2:32 PM and  verbalized understanding of the urgent finding.    Basic metabolic panel     Status: Abnormal   Result Value Ref Range    Sodium 138 133 - 144 mmol/L    Potassium 4.7 3.4 - 5.3 mmol/L    Chloride 110 (H) 94 - 109 mmol/L    Carbon Dioxide (CO2) 23 20 - 32 mmol/L    Anion Gap 5 3 - 14 mmol/L    Urea Nitrogen 20 7 - 30 mg/dL    Creatinine 0.86 0.66 - 1.25 mg/dL    Calcium 8.8 8.5 - 10.1 mg/dL    Glucose 112 (H) 70 - 99 mg/dL    GFR Estimate >90 >60 mL/min/1.73m2   CRP inflammation     Status: Abnormal   Result Value Ref Range    CRP Inflammation 220.0 (H) 0.0 - 8.0 mg/L   Erythrocyte sedimentation rate auto     Status: Abnormal   Result Value Ref Range    Erythrocyte Sedimentation Rate 45 (H) 0 - 15 mm/hr   CBC with platelets and differential     Status: Abnormal   Result Value Ref Range    WBC Count 14.9 (H) 4.0 - 11.0 10e3/uL    RBC Count 3.88 (L) 4.40 - 5.90 10e6/uL    Hemoglobin 12.1 (L) 13.3 - 17.7 g/dL    Hematocrit 37.2 (L) 40.0 - 53.0 %    MCV 96 78 - 100 fL    MCH 31.2 26.5 - 33.0 pg    MCHC 32.5 31.5 - 36.5 g/dL    RDW 12.8 10.0 - 15.0 %    Platelet Count 343 150 - 450 10e3/uL    % Neutrophils 83 %    % Lymphocytes 7 %    % Monocytes 8 %    % Eosinophils 1 %    % Basophils 1 %    % Immature Granulocytes 0 %    NRBCs per 100 WBC 0  <1 /100    Absolute Neutrophils 12.5 (H) 1.6 - 8.3 10e3/uL    Absolute Lymphocytes 1.0 0.8 - 5.3 10e3/uL    Absolute Monocytes 1.2 0.0 - 1.3 10e3/uL    Absolute Eosinophils 0.1 0.0 - 0.7 10e3/uL    Absolute Basophils 0.1 0.0 - 0.2 10e3/uL    Absolute Immature Granulocytes 0.1 <=0.4 10e3/uL    Absolute NRBCs 0.0 10e3/uL   Extra Blue Top Tube     Status: None   Result Value Ref Range    Hold Specimen JIC    Extra Red Top Tube     Status: None   Result Value Ref Range    Hold Specimen JIC    CBC with platelets differential     Status: Abnormal    Narrative    The following orders were created for panel order CBC with platelets differential.  Procedure                               Abnormality         Status                     ---------                               -----------         ------                     CBC with platelets and d...[[id number]]  Abnormal            Final result                 Please view results for these tests on the individual orders.   [place] Draw     Status: None    Narrative    The following orders were created for panel order [place] Draw.  Procedure                               Abnormality         Status                     ---------                               -----------         ------                     Extra Blue Top Tube[[id number]]                              Final result               Extra Red Top Tube[[id number]]                               Final result                 Please view results for these tests on the individual orders.     Medications   0.9% sodium chloride BOLUS (0 mLs Intravenous Stopped 1/12/22 1445)     Followed by   sodium chloride 0.9% infusion (has no administration in time range)   LORazepam (ATIVAN) injection 0.5 mg (has no administration in time range)   clindamycin (CLEOCIN) infusion 900 mg (900 mg Intravenous New Bag 1/12/22 1445)   CT scan flush (60 mLs Intravenous Given 1/12/22 1330)   iopamidol (ISOVUE-370) solution 100 mL (100 mLs Intravenous  Given 1/12/22 3835)        Assessments & Plan (with Medical Decision Making)   This patient presented to the emergency department with worsening left-sided facial swelling.  He was diagnosed with an odontogenic abscess yesterday by CT scan and had swelling noted that was extending into the submandibular and submental space.  Symptoms have worsened and he did report some difficulty breathing this morning but airway seems intact currently.  He is afebrile, but does have a leukocytosis and significant elevation of inflammatory markers.  CT scan of the face and neck was repeated as we did not have any imaging on record here and this demonstrated redemonstration of the left mandibular abscess but there is now concern for potential deeper spread into the retropharyngeal space.  I did consult with oral surgery and they will evaluate the patient in the emergency department.  Patient was given a dose of IV clindamycin here in the emergency room and kept NPO.  Patient's airway has remained intact while in the emergency department.    I have reviewed the nursing notes. I have reviewed the findings, diagnosis, plan and need for follow up with the patient.    New Prescriptions    No medications on file       Final diagnoses:   Abscess of mandible       --  Jorge A Elliott  [facility] EMERGENCY DEPARTMENT  1/12/2022     Jorge A Elliott MD  01/12/22 7830

## 2022-01-12 NOTE — CONSULTS
ORAL & MAXILLOFACIAL SURGERY   CONSULT  Marshall Yoon,  MRN: [id number],  : 1990        ASSESSMENT   31 year old male with left submandibular and left pterygomandibular space infection. Likely odontogenic infection associated with #17 and #18. PARL noted of #18.     RECOMMENDATIONS  Recommend admission to Medicine  Continue IV antibiotics  Pain management PRN per primary team  Recommend dexamethasone 6mg q6 hrs  Recommend fluids per primary team  Remain NPO for surgery tomorrow   Recommend source control: incision and drainage and extraction of #17 and #18 (others as needed) in OR under general anesthesia. Case added on OR list. Can anticipate taking to OR sometime tomorrow .      Please contact the OMFS resident on-call with questions or concerns.    Discussed with chief resident and staff.    Heydi Ladd, ISABELL  Oral & Maxillofacial Surgery, PGY-1    ____________________________________________      HPI  31 year old male with history of BAO presenting to the ED with left sided facial swelling, likely odontogenic in origin in association with large carious lesion of #18. No recent dental work done. Patient reports swelling started in the beginning of January but it was on 1/10 that swelling progressively worsened. He has an appointment with [facility] on  but with worsening swelling, he presented to the ED.     CT Facial bones was taken at [facility] on 1/10 and re-taken at ED today due to inability to obtain images/report from outside source. Afebrile upon ED presentation but with leukocytosis. Was started on PO clindamycin yesterday  but reports no improvement. Started on IV clindamycin in ED. Denies f/v/c, dyspnea, and chest pain but endorses mild dysphagia and odynophagia.    HISTORY  Past Medical History:   Past Medical History:   Diagnosis Date     Anxiety      Past Surgical History: No past surgical history on file.  Medications:   No current  facility-administered medications on file prior to encounter.  ALPRAZolam (XANAX) 0.5 MG tablet, Take 1 tablet (0.5 mg) by mouth 3 times daily as needed for anxiety         LORazepam  0.5 mg Intravenous Once     Allergies: No Known Allergies  Social History:   Social History     Socioeconomic History     Marital status: Single     Spouse name: Not on file     Number of children: Not on file     Years of education: Not on file     Highest education level: Not on file   Occupational History     Not on file   Tobacco Use     Smoking status: Current Every Day Smoker     Packs/day: 0.25     Smokeless tobacco: Never Used   Substance and Sexual Activity     Alcohol use: No     Drug use: Not on file     Sexual activity: Never   Other Topics Concern     Not on file   Social History Narrative     Not on file     Social Determinants of Health     Financial Resource Strain: Not on file   Food Insecurity: Not on file   Transportation Needs: Not on file   Physical Activity: Not on file   Stress: Not on file   Social Connections: Not on file   Intimate Partner Violence: Not on file   Housing Stability: Not on file       REVIEW OF SYSTEMS  10 point ROS reviewed and negative aside from listed in HPI    PHYSICAL EXAM  Vital Signs:   Vitals:    01/12/22 1203 01/12/22 1400 01/12/22 1430 01/12/22 1515   BP: (!) 142/97 (!) 148/87 (!) 136/94 (!) 158/95   Pulse: 100 90 86 80   Resp: 18 11 25 16   Temp: 98.2  F (36.8  C)      SpO2: 97% 99% 100% 98%       GEN: WD/WN male, pleasant  HEENT: EOMI, PERRL, limited BRENT ~20mm, guarding present, FOM soft, non-distended, no elevation of tongue. Vestibular edema present lingual of #17 and #18, tender to touch. Swelling of left submandibular, submental, and sublingual regions, induration present, mild erythema, tenderness to touch and palpation. Unable to palpate inferior border of mandible. No difficulty with phonation or swallowing, tolerating secretions. Unable to visualize uvula due to limited BRENT.    CV: RRR, warm and well-perfused  PULM: CTAB, breathing comfortably on room air  GI: Soft, ND/NT  MSK: SPIVEY, no peripheral extremity edema  NEURO: AAOx4  PSYCH: Appropriate mood and affect            REVIEW OF LABORATORY DATA  Most Recent 3 CBC's:  Recent Labs   Lab Test 01/12/22  1229 06/12/18  1536   WBC 14.9* 9.5   HGB 12.1* 15.3   MCV 96 90    293      Most Recent 3 BMP's:  Recent Labs   Lab Test 01/12/22  1229 06/12/18  1536    136   POTASSIUM 4.7 4.9   CHLORIDE 110* 105   CO2 23 25   BUN 20 15   CR 0.86 0.97   ANIONGAP 5 6   BRANDON 8.8 8.8   * 98     Most Recent 2 LFT's:No lab results found.  Most Recent INR's and Anticoagulation Dosing History:  Anticoagulation Dose History    There is no flowsheet data to display.       Most Recent 3 Troponin's:  Recent Labs   Lab Test 06/12/18  1536   TROPI <0.015     Most Recent Cholesterol Panel:No lab results found.  Most Recent 6 Bacteria Isolates From Any Culture (See EPIC Reports for Culture Details):No lab results found.  Most Recent TSH, T4 and A1c Labs:No lab results found.    IMAGING RESULTS (Include outside hospital results)     Independently reviewed  Soft tissue neck CT w/ contrast 1/12:  Left submandibular and left pterygomandibular space abscess

## 2022-01-12 NOTE — ED TRIAGE NOTES
Pt presents to triage from home experiencing L sided facial swelling and sternal swelling. Pt states that he first noticed this swelling at the beginning of January, then it got better, and now his symptoms have worsened. Pt states that this swelling sometimes effects his breathing. Was seen Monday at  [place]. Got IV antibiotics, and was discharged with Clindamycin. Pt states the swelling has worsened since his visit there. No recent dental work, no tooth pain.    Luz Elena Haddad RN on 1/12/2022 at 11:59 AM

## 2022-01-12 NOTE — ED NOTES
Patient signed out to me by Dr. Elliott pending OMFS final recommendations.  He has a deep space infection of the face, mandible/neck.  Already received clindamycin.  OM recommends admission to medicine, will take to the OR likely tomorrow.  N.p.o. at midnight.  Continue antibiotics.  Decadron 6 mg every 6 hours recommended as well.  We will proceed with this plan.     Faustino Navarro MD  01/12/22 5584

## 2022-01-13 LAB
ANION GAP SERPL CALCULATED.3IONS-SCNC: 8 MMOL/L (ref 3–14)
BUN SERPL-MCNC: 14 MG/DL (ref 7–30)
CALCIUM SERPL-MCNC: 9.4 MG/DL (ref 8.5–10.1)
CHLORIDE BLD-SCNC: 103 MMOL/L (ref 94–109)
CO2 SERPL-SCNC: 23 MMOL/L (ref 20–32)
CREAT SERPL-MCNC: 0.72 MG/DL (ref 0.66–1.25)
ERYTHROCYTE [DISTWIDTH] IN BLOOD BY AUTOMATED COUNT: 12.4 % (ref 10–15)
GFR SERPL CREATININE-BSD FRML MDRD: >90 ML/MIN/1.73M2
GLUCOSE BLD-MCNC: 136 MG/DL (ref 70–99)
GRAM STAIN RESULT: ABNORMAL
GRAM STAIN RESULT: ABNORMAL
HCT VFR BLD AUTO: 37.3 % (ref 40–53)
HGB BLD-MCNC: 12.2 G/DL (ref 13.3–17.7)
HIV 1+2 AB+HIV1 P24 AG SERPL QL IA: NONREACTIVE
MCH RBC QN AUTO: 31 PG (ref 26.5–33)
MCHC RBC AUTO-ENTMCNC: 32.7 G/DL (ref 31.5–36.5)
MCV RBC AUTO: 95 FL (ref 78–100)
PLATELET # BLD AUTO: 412 10E3/UL (ref 150–450)
POTASSIUM BLD-SCNC: 4.5 MMOL/L (ref 3.4–5.3)
RBC # BLD AUTO: 3.93 10E6/UL (ref 4.4–5.9)
SODIUM SERPL-SCNC: 134 MMOL/L (ref 133–144)
WBC # BLD AUTO: 18 10E3/UL (ref 4–11)

## 2022-01-13 PROCEDURE — 272N000001 HC OR GENERAL SUPPLY STERILE: Performed by: DENTIST

## 2022-01-13 PROCEDURE — 250N000009 HC RX 250: Performed by: DENTIST

## 2022-01-13 PROCEDURE — 250N000013 HC RX MED GY IP 250 OP 250 PS 637: Performed by: INTERNAL MEDICINE

## 2022-01-13 PROCEDURE — 87205 SMEAR GRAM STAIN: CPT | Performed by: DENTIST

## 2022-01-13 PROCEDURE — 250N000011 HC RX IP 250 OP 636: Performed by: INTERNAL MEDICINE

## 2022-01-13 PROCEDURE — 250N000013 HC RX MED GY IP 250 OP 250 PS 637: Performed by: STUDENT IN AN ORGANIZED HEALTH CARE EDUCATION/TRAINING PROGRAM

## 2022-01-13 PROCEDURE — 999N000141 HC STATISTIC PRE-PROCEDURE NURSING ASSESSMENT: Performed by: DENTIST

## 2022-01-13 PROCEDURE — 87075 CULTR BACTERIA EXCEPT BLOOD: CPT | Performed by: DENTIST

## 2022-01-13 PROCEDURE — 250N000011 HC RX IP 250 OP 636: Performed by: PHYSICIAN ASSISTANT

## 2022-01-13 PROCEDURE — 258N000003 HC RX IP 258 OP 636: Performed by: NURSE ANESTHETIST, CERTIFIED REGISTERED

## 2022-01-13 PROCEDURE — 0J910ZZ DRAINAGE OF FACE SUBCUTANEOUS TISSUE AND FASCIA, OPEN APPROACH: ICD-10-PCS | Performed by: DENTIST

## 2022-01-13 PROCEDURE — 710N000010 HC RECOVERY PHASE 1, LEVEL 2, PER MIN: Performed by: DENTIST

## 2022-01-13 PROCEDURE — 120N000002 HC R&B MED SURG/OB UMMC

## 2022-01-13 PROCEDURE — 370N000017 HC ANESTHESIA TECHNICAL FEE, PER MIN: Performed by: DENTIST

## 2022-01-13 PROCEDURE — 250N000011 HC RX IP 250 OP 636: Performed by: STUDENT IN AN ORGANIZED HEALTH CARE EDUCATION/TRAINING PROGRAM

## 2022-01-13 PROCEDURE — 250N000011 HC RX IP 250 OP 636: Performed by: NURSE ANESTHETIST, CERTIFIED REGISTERED

## 2022-01-13 PROCEDURE — 99238 HOSP IP/OBS DSCHRG MGMT 30/<: CPT | Mod: 25 | Performed by: STUDENT IN AN ORGANIZED HEALTH CARE EDUCATION/TRAINING PROGRAM

## 2022-01-13 PROCEDURE — 250N000013 HC RX MED GY IP 250 OP 250 PS 637: Performed by: PHYSICIAN ASSISTANT

## 2022-01-13 PROCEDURE — 99232 SBSQ HOSP IP/OBS MODERATE 35: CPT | Performed by: INTERNAL MEDICINE

## 2022-01-13 PROCEDURE — 85027 COMPLETE CBC AUTOMATED: CPT | Performed by: PHYSICIAN ASSISTANT

## 2022-01-13 PROCEDURE — 80048 BASIC METABOLIC PNL TOTAL CA: CPT | Performed by: PHYSICIAN ASSISTANT

## 2022-01-13 PROCEDURE — 250N000025 HC SEVOFLURANE, PER MIN: Performed by: DENTIST

## 2022-01-13 PROCEDURE — 250N000009 HC RX 250: Performed by: NURSE ANESTHETIST, CERTIFIED REGISTERED

## 2022-01-13 PROCEDURE — 36415 COLL VENOUS BLD VENIPUNCTURE: CPT | Performed by: PHYSICIAN ASSISTANT

## 2022-01-13 PROCEDURE — 0CTX0Z1 RESECTION OF LOWER TOOTH, MULTIPLE, OPEN APPROACH: ICD-10-PCS | Performed by: DENTIST

## 2022-01-13 PROCEDURE — 360N000075 HC SURGERY LEVEL 2, PER MIN: Performed by: DENTIST

## 2022-01-13 PROCEDURE — 87077 CULTURE AEROBIC IDENTIFY: CPT | Performed by: DENTIST

## 2022-01-13 PROCEDURE — 87070 CULTURE OTHR SPECIMN AEROBIC: CPT | Performed by: DENTIST

## 2022-01-13 RX ORDER — DEXAMETHASONE SODIUM PHOSPHATE 4 MG/ML
INJECTION, SOLUTION INTRA-ARTICULAR; INTRALESIONAL; INTRAMUSCULAR; INTRAVENOUS; SOFT TISSUE PRN
Status: DISCONTINUED | OUTPATIENT
Start: 2022-01-13 | End: 2022-01-13

## 2022-01-13 RX ORDER — SODIUM CHLORIDE, SODIUM LACTATE, POTASSIUM CHLORIDE, CALCIUM CHLORIDE 600; 310; 30; 20 MG/100ML; MG/100ML; MG/100ML; MG/100ML
INJECTION, SOLUTION INTRAVENOUS CONTINUOUS PRN
Status: DISCONTINUED | OUTPATIENT
Start: 2022-01-13 | End: 2022-01-13

## 2022-01-13 RX ORDER — HYDROMORPHONE HCL IN WATER/PF 6 MG/30 ML
0.2 PATIENT CONTROLLED ANALGESIA SYRINGE INTRAVENOUS EVERY 5 MIN PRN
Status: DISCONTINUED | OUTPATIENT
Start: 2022-01-13 | End: 2022-01-13 | Stop reason: HOSPADM

## 2022-01-13 RX ORDER — ONDANSETRON 2 MG/ML
4 INJECTION INTRAMUSCULAR; INTRAVENOUS EVERY 30 MIN PRN
Status: DISCONTINUED | OUTPATIENT
Start: 2022-01-13 | End: 2022-01-13 | Stop reason: HOSPADM

## 2022-01-13 RX ORDER — IBUPROFEN 600 MG/1
600 TABLET, FILM COATED ORAL EVERY 6 HOURS PRN
COMMUNITY
End: 2022-06-10

## 2022-01-13 RX ORDER — ACETAMINOPHEN 325 MG/1
650 TABLET ORAL EVERY 6 HOURS PRN
Status: DISCONTINUED | OUTPATIENT
Start: 2022-01-13 | End: 2022-01-13

## 2022-01-13 RX ORDER — FENTANYL CITRATE 50 UG/ML
INJECTION, SOLUTION INTRAMUSCULAR; INTRAVENOUS PRN
Status: DISCONTINUED | OUTPATIENT
Start: 2022-01-13 | End: 2022-01-13

## 2022-01-13 RX ORDER — SODIUM CHLORIDE, SODIUM LACTATE, POTASSIUM CHLORIDE, CALCIUM CHLORIDE 600; 310; 30; 20 MG/100ML; MG/100ML; MG/100ML; MG/100ML
INJECTION, SOLUTION INTRAVENOUS CONTINUOUS
Status: DISCONTINUED | OUTPATIENT
Start: 2022-01-13 | End: 2022-01-13 | Stop reason: HOSPADM

## 2022-01-13 RX ORDER — ALPRAZOLAM 0.5 MG
0.5 TABLET ORAL 3 TIMES DAILY PRN
Status: DISCONTINUED | OUTPATIENT
Start: 2022-01-13 | End: 2022-01-14 | Stop reason: HOSPADM

## 2022-01-13 RX ORDER — PROPOFOL 10 MG/ML
INJECTION, EMULSION INTRAVENOUS PRN
Status: DISCONTINUED | OUTPATIENT
Start: 2022-01-13 | End: 2022-01-13

## 2022-01-13 RX ORDER — IBUPROFEN 600 MG/1
600 TABLET, FILM COATED ORAL EVERY 8 HOURS PRN
Status: DISCONTINUED | OUTPATIENT
Start: 2022-01-13 | End: 2022-01-14 | Stop reason: HOSPADM

## 2022-01-13 RX ORDER — OXYCODONE HYDROCHLORIDE 5 MG/1
5 TABLET ORAL EVERY 4 HOURS PRN
Status: DISCONTINUED | OUTPATIENT
Start: 2022-01-13 | End: 2022-01-13 | Stop reason: HOSPADM

## 2022-01-13 RX ORDER — DEXAMETHASONE SODIUM PHOSPHATE 4 MG/ML
6 INJECTION, SOLUTION INTRA-ARTICULAR; INTRALESIONAL; INTRAMUSCULAR; INTRAVENOUS; SOFT TISSUE ONCE
Status: DISCONTINUED | OUTPATIENT
Start: 2022-01-13 | End: 2022-01-13

## 2022-01-13 RX ORDER — ONDANSETRON 4 MG/1
4 TABLET, ORALLY DISINTEGRATING ORAL EVERY 30 MIN PRN
Status: DISCONTINUED | OUTPATIENT
Start: 2022-01-13 | End: 2022-01-13 | Stop reason: HOSPADM

## 2022-01-13 RX ORDER — HYDRALAZINE HYDROCHLORIDE 20 MG/ML
5 INJECTION INTRAMUSCULAR; INTRAVENOUS EVERY 8 HOURS PRN
Status: DISCONTINUED | OUTPATIENT
Start: 2022-01-13 | End: 2022-01-14 | Stop reason: HOSPADM

## 2022-01-13 RX ORDER — BUPIVACAINE HYDROCHLORIDE AND EPINEPHRINE 5; 5 MG/ML; UG/ML
INJECTION, SOLUTION PERINEURAL PRN
Status: DISCONTINUED | OUTPATIENT
Start: 2022-01-13 | End: 2022-01-13 | Stop reason: HOSPADM

## 2022-01-13 RX ORDER — TRAZODONE HYDROCHLORIDE 50 MG/1
50 TABLET, FILM COATED ORAL PRN
COMMUNITY
Start: 2022-01-11 | End: 2022-06-15

## 2022-01-13 RX ORDER — FENTANYL CITRATE 50 UG/ML
25 INJECTION, SOLUTION INTRAMUSCULAR; INTRAVENOUS EVERY 5 MIN PRN
Status: DISCONTINUED | OUTPATIENT
Start: 2022-01-13 | End: 2022-01-13 | Stop reason: HOSPADM

## 2022-01-13 RX ORDER — CLINDAMYCIN HCL 300 MG
CAPSULE ORAL
Status: ON HOLD | COMMUNITY
Start: 2022-01-11 | End: 2022-01-13

## 2022-01-13 RX ORDER — CYCLOBENZAPRINE HCL 10 MG
10 TABLET ORAL
COMMUNITY
Start: 2021-03-03 | End: 2022-06-10

## 2022-01-13 RX ORDER — LIDOCAINE HYDROCHLORIDE 20 MG/ML
INJECTION, SOLUTION INFILTRATION; PERINEURAL PRN
Status: DISCONTINUED | OUTPATIENT
Start: 2022-01-13 | End: 2022-01-13

## 2022-01-13 RX ADMIN — LIDOCAINE HYDROCHLORIDE 100 MG: 20 INJECTION, SOLUTION INFILTRATION; PERINEURAL at 10:21

## 2022-01-13 RX ADMIN — ACETAMINOPHEN 975 MG: 325 TABLET, FILM COATED ORAL at 06:39

## 2022-01-13 RX ADMIN — PROPOFOL 20 MG: 10 INJECTION, EMULSION INTRAVENOUS at 10:46

## 2022-01-13 RX ADMIN — FENTANYL CITRATE 100 MCG: 50 INJECTION, SOLUTION INTRAMUSCULAR; INTRAVENOUS at 10:21

## 2022-01-13 RX ADMIN — DEXAMETHASONE 6 MG: 2 TABLET ORAL at 21:46

## 2022-01-13 RX ADMIN — HYDROMORPHONE HYDROCHLORIDE 0.5 MG: 1 INJECTION, SOLUTION INTRAMUSCULAR; INTRAVENOUS; SUBCUTANEOUS at 03:26

## 2022-01-13 RX ADMIN — ALPRAZOLAM 0.5 MG: 0.5 TABLET ORAL at 15:17

## 2022-01-13 RX ADMIN — OXYCODONE HYDROCHLORIDE 5 MG: 5 TABLET ORAL at 04:42

## 2022-01-13 RX ADMIN — OXYCODONE HYDROCHLORIDE 5 MG: 5 TABLET ORAL at 13:06

## 2022-01-13 RX ADMIN — OXYCODONE HYDROCHLORIDE 5 MG: 5 TABLET ORAL at 17:57

## 2022-01-13 RX ADMIN — FENTANYL CITRATE 25 MCG: 50 INJECTION, SOLUTION INTRAMUSCULAR; INTRAVENOUS at 11:36

## 2022-01-13 RX ADMIN — SODIUM CHLORIDE, POTASSIUM CHLORIDE, SODIUM LACTATE AND CALCIUM CHLORIDE: 600; 310; 30; 20 INJECTION, SOLUTION INTRAVENOUS at 10:15

## 2022-01-13 RX ADMIN — DEXAMETHASONE SODIUM PHOSPHATE 8 MG: 4 INJECTION, SOLUTION INTRA-ARTICULAR; INTRALESIONAL; INTRAMUSCULAR; INTRAVENOUS; SOFT TISSUE at 10:30

## 2022-01-13 RX ADMIN — KETOROLAC TROMETHAMINE 30 MG: 30 INJECTION, SOLUTION INTRAMUSCULAR; INTRAVENOUS at 03:26

## 2022-01-13 RX ADMIN — ROCURONIUM BROMIDE 10 MG: 50 INJECTION, SOLUTION INTRAVENOUS at 10:41

## 2022-01-13 RX ADMIN — HYDROMORPHONE HYDROCHLORIDE 0.5 MG: 1 INJECTION, SOLUTION INTRAMUSCULAR; INTRAVENOUS; SUBCUTANEOUS at 01:08

## 2022-01-13 RX ADMIN — FENTANYL CITRATE 50 MCG: 50 INJECTION, SOLUTION INTRAMUSCULAR; INTRAVENOUS at 11:09

## 2022-01-13 RX ADMIN — ROCURONIUM BROMIDE 30 MG: 50 INJECTION, SOLUTION INTRAVENOUS at 10:30

## 2022-01-13 RX ADMIN — FENTANYL CITRATE 25 MCG: 50 INJECTION, SOLUTION INTRAMUSCULAR; INTRAVENOUS at 11:40

## 2022-01-13 RX ADMIN — DEXAMETHASONE SODIUM PHOSPHATE 6 MG: 10 INJECTION, SOLUTION INTRAMUSCULAR; INTRAVENOUS at 05:34

## 2022-01-13 RX ADMIN — ACETAMINOPHEN 975 MG: 325 TABLET, FILM COATED ORAL at 14:11

## 2022-01-13 RX ADMIN — OXYCODONE HYDROCHLORIDE 5 MG: 5 TABLET ORAL at 08:53

## 2022-01-13 RX ADMIN — SUCCINYLCHOLINE CHLORIDE 120 MG: 20 INJECTION, SOLUTION INTRAMUSCULAR; INTRAVENOUS; PARENTERAL at 10:21

## 2022-01-13 RX ADMIN — FENTANYL CITRATE 50 MCG: 50 INJECTION, SOLUTION INTRAMUSCULAR; INTRAVENOUS at 10:41

## 2022-01-13 RX ADMIN — OXYCODONE HYDROCHLORIDE 5 MG: 5 TABLET ORAL at 21:47

## 2022-01-13 RX ADMIN — SUGAMMADEX 200 MG: 100 INJECTION, SOLUTION INTRAVENOUS at 10:58

## 2022-01-13 RX ADMIN — AMPICILLIN SODIUM AND SULBACTAM SODIUM 3 G: 2; 1 INJECTION, POWDER, FOR SOLUTION INTRAMUSCULAR; INTRAVENOUS at 06:39

## 2022-01-13 RX ADMIN — HYDROMORPHONE HYDROCHLORIDE 0.5 MG: 1 INJECTION, SOLUTION INTRAMUSCULAR; INTRAVENOUS; SUBCUTANEOUS at 05:34

## 2022-01-13 RX ADMIN — DEXAMETHASONE SODIUM PHOSPHATE 6 MG: 10 INJECTION, SOLUTION INTRAMUSCULAR; INTRAVENOUS at 01:47

## 2022-01-13 RX ADMIN — AMPICILLIN SODIUM AND SULBACTAM SODIUM 3 G: 2; 1 INJECTION, POWDER, FOR SOLUTION INTRAMUSCULAR; INTRAVENOUS at 01:48

## 2022-01-13 RX ADMIN — ONDANSETRON 4 MG: 2 INJECTION INTRAMUSCULAR; INTRAVENOUS at 11:36

## 2022-01-13 RX ADMIN — KETOROLAC TROMETHAMINE 30 MG: 30 INJECTION, SOLUTION INTRAMUSCULAR; INTRAVENOUS at 14:44

## 2022-01-13 RX ADMIN — ACETAMINOPHEN 975 MG: 325 TABLET, FILM COATED ORAL at 20:12

## 2022-01-13 RX ADMIN — AMOXICILLIN AND CLAVULANATE POTASSIUM 1 TABLET: 875; 125 TABLET, FILM COATED ORAL at 20:12

## 2022-01-13 RX ADMIN — Medication 8 MCG: at 10:42

## 2022-01-13 RX ADMIN — MIDAZOLAM 2 MG: 1 INJECTION INTRAMUSCULAR; INTRAVENOUS at 10:14

## 2022-01-13 RX ADMIN — Medication 16 MCG: at 10:58

## 2022-01-13 RX ADMIN — ALPRAZOLAM 0.5 MG: 0.5 TABLET ORAL at 20:12

## 2022-01-13 RX ADMIN — IBUPROFEN 600 MG: 600 TABLET, FILM COATED ORAL at 23:26

## 2022-01-13 RX ADMIN — FENTANYL CITRATE 25 MCG: 50 INJECTION, SOLUTION INTRAMUSCULAR; INTRAVENOUS at 12:00

## 2022-01-13 RX ADMIN — HYDROMORPHONE HYDROCHLORIDE 0.5 MG: 1 INJECTION, SOLUTION INTRAMUSCULAR; INTRAVENOUS; SUBCUTANEOUS at 07:52

## 2022-01-13 RX ADMIN — PROPOFOL 30 MG: 10 INJECTION, EMULSION INTRAVENOUS at 10:26

## 2022-01-13 RX ADMIN — PROPOFOL 200 MG: 10 INJECTION, EMULSION INTRAVENOUS at 10:21

## 2022-01-13 RX ADMIN — Medication 16 MCG: at 10:32

## 2022-01-13 RX ADMIN — AMPICILLIN SODIUM AND SULBACTAM SODIUM 3 G: 2; 1 INJECTION, POWDER, FOR SOLUTION INTRAMUSCULAR; INTRAVENOUS at 14:11

## 2022-01-13 RX ADMIN — HYDROMORPHONE HYDROCHLORIDE 0.2 MG: 0.2 INJECTION, SOLUTION INTRAMUSCULAR; INTRAVENOUS; SUBCUTANEOUS at 12:09

## 2022-01-13 RX ADMIN — ONDANSETRON 4 MG: 2 INJECTION INTRAMUSCULAR; INTRAVENOUS at 10:30

## 2022-01-13 RX ADMIN — FENTANYL CITRATE 25 MCG: 50 INJECTION, SOLUTION INTRAMUSCULAR; INTRAVENOUS at 11:50

## 2022-01-13 ASSESSMENT — ACTIVITIES OF DAILY LIVING (ADL)
ADLS_ACUITY_SCORE: 7

## 2022-01-13 NOTE — PROGRESS NOTES
Brief progress note:   - Now s/p incision and drainage of left submandibular space and pterygomandibular space abscess, extraction of teeth #17, 18, 32 and placement of 2x penrose drains.   - Ok to advance to soft diet as tolerated   - Continue Unasyn/augmentin for total of 7 days from today  - Continue Peridex for 2 weeks  - No ice in setting of infection  - HOB > 30 deg  - Nursing ok to change fluffs to left neck prn as saturated   - OMFS will round on patient late afternoon and determine if patient is ok for discharge today   - Patient will follow up 1/18 at 11 am at [facility], 7th floor [facility] clinic.   - Please call with questions      Miguel Thompson DDS PGY 4

## 2022-01-13 NOTE — ANESTHESIA PROCEDURE NOTES
Airway       Patient location during procedure: OR       Procedure Start/Stop Times: 1/13/2022 10:26 AM  Staff -        Anesthesiologist:  Lacey Hendrix MD       Performed By: with residents       Procedure performed by resident/fellow/CRNA in presence of a teaching physician.    Consent for Airway        Urgency: elective  Indications and Patient Condition       Indications for airway management: rona-procedural       Induction type:intravenous       Mask difficulty assessment: 1 - vent by mask (two-handed)    Final Airway Details       Final airway type: endotracheal airway       Successful airway: ETT - single and Oral  Endotracheal Airway Details        ETT size (mm): 8.0       Cuffed: yes       Cuff volume (mL): 10       Successful intubation technique: direct laryngoscopy       DL Blade Type: MAC 3       Grade View of Cords: 2       Adjucts: stylet       Position: Right       Measured from: lips       Secured at (cm): 25       Bite block used: None    Post intubation assessment        Placement verified by: capnometry, equal breath sounds and chest rise        Number of attempts at approach: 1       Secured with: pink tape (and with tegaderms)       Ease of procedure: easy       Dentition: Unchanged

## 2022-01-13 NOTE — ANESTHESIA PREPROCEDURE EVALUATION
Anesthesia Pre-Procedure Evaluation    Patient: Marshall Yoon   MRN: [id number] : 1990        Preoperative Diagnosis: Submandibular abscess [K12.2]    Procedure : Procedure(s):  EXTRACTION, TOOTH 17 and 18 with incision and drainage          Past Medical History:   Diagnosis Date     Anxiety       No past surgical history on file.   No Known Allergies   Social History     Tobacco Use     Smoking status: Current Every Day Smoker     Packs/day: 0.25     Smokeless tobacco: Never Used   Substance Use Topics     Alcohol use: No      Wt Readings from Last 1 Encounters:   18 117.9 kg (260 lb)        Anesthesia Evaluation            ROS/MED HX  ENT/Pulmonary:  - neg pulmonary ROS     Neurologic:  - neg neurologic ROS     Cardiovascular:  - neg cardiovascular ROS     METS/Exercise Tolerance: >4 METS    Hematologic:  - neg hematologic  ROS     Musculoskeletal:  - neg musculoskeletal ROS     GI/Hepatic:  - neg GI/hepatic ROS     Renal/Genitourinary:  - neg Renal ROS     Endo:  - neg endo ROS     Psychiatric/Substance Use:     (+) psychiatric history anxiety : h/o IVDU.    Infectious Disease: Comment: odontogenic infection: CT scan w/ subperiosteal abscess extending along left body of mandible, 2x1.3 cm submandibular abscess, extensive inflammation/infection extends into left submandibular , buccal spaces w/o definite collection, extensive cellulitis in bilateral neck, L>R, edematous enlargement of retropharyngeal space      Malignancy:  - neg malignancy ROS     Other:               OUTSIDE LABS:  CBC:   Lab Results   Component Value Date    WBC 14.9 (H) 2022    WBC 9.5 2018    HGB 12.1 (L) 2022    HGB 15.3 2018    HCT 37.2 (L) 2022    HCT 45.3 2018     2022     2018     BMP:   Lab Results   Component Value Date     2022     2018    POTASSIUM 4.7 2022    POTASSIUM 4.9 2018    CHLORIDE 110 (H) 2022     CHLORIDE 105 06/12/2018    CO2 23 01/12/2022    CO2 25 06/12/2018    BUN 20 01/12/2022    BUN 15 06/12/2018    CR 0.86 01/12/2022    CR 0.97 06/12/2018     (H) 01/12/2022    GLC 98 06/12/2018     COAGS: No results found for: PTT, INR, FIBR  POC: No results found for: BGM, HCG, HCGS  HEPATIC: No results found for: ALBUMIN, PROTTOTAL, ALT, AST, GGT, ALKPHOS, BILITOTAL, BILIDIRECT, ADA  OTHER:   Lab Results   Component Value Date    BRANDON 8.8 01/12/2022    .0 (H) 01/12/2022    SED 45 (H) 01/12/2022       Anesthesia Plan    ASA Status:  3   NPO Status:  NPO Appropriate    Anesthesia Type: General.     - Airway: ETT   Induction: Intravenous.   Maintenance: Inhalation.   Techniques and Equipment:     - Lines/Monitors: BIS     Consents            Postoperative Care    Pain management: IV analgesics.   PONV prophylaxis: Ondansetron (or other 5HT-3)     Comments:                Zuleyma Pitts MD

## 2022-01-13 NOTE — UTILIZATION REVIEW
Admission Status; Secondary Review Determination    Under the authority of the Utilization Management Committee, the utilization review process indicated a secondary review on the above patient. The review outcome is based on review of the medical records, discussions with staff, and applying clinical experience noted on the date of the review.    (x) Inpatient Status Appropriate - This patient's medical care is consistent with medical management for inpatient care and reasonable inpatient medical practice.    RATIONALE FOR DETERMINATION:31-year-old male with left mandibular, submental, sublingual, pterygomandibular, masseteric, and parapharyngeal space infection. Likely odontogenic infection associated with #17 and #18. PARL noted of #17 and #18.  Patient has history of polysubstance abuse, substance-induced psychosis and presented to the hospital after receiving only 1 day of clindamycin with worsening symptoms including difficulty breathing.  Patient has CRP of 220, leukocytosis that has progressed on hospital day 2.  The severity of patient's odontogenic infection will require ongoing IV antibiotics, steroids as well as incision and drainage of the abscesses appropriate for inpatient management.    At the time of admission with the information available to the attending physician more than 2 nights Hospital complex care was anticipated, based on patient risk of adverse outcome if treated as outpatient and complex care required. Inpatient admission is appropriate based on the Medicare guidelines.    This document was produced using voice recognition software    The information on this document is developed by the utilization review team in order for the business office to ensure compliance. This only denotes the appropriateness of proper admission status and does not reflect the quality of care rendered.    The definitions of Inpatient Status and Observation Status used in making the determination above are  those provided in the CMS Coverage Manual, Chapter 1 and Chapter 6, section 70.4.    Sincerely,    Pardeep White MD  Utilization Review  Physician Advisor  [facility].

## 2022-01-13 NOTE — PLAN OF CARE
[id number]    Activity: UAL  Neuros: Intact. A&O x4. Right jaw swelling.  Cardiac: WDL x hypertensive  Respiratory: WDL on RA. Denies SOB.  GI/: Voiding spontaneously. +BS, passing flatus.   Diet: NPO  Skin: CDI. 2 RN skin check completed by Irina URBINA And Kerri URBINA with no significant findings.  Lines: PIV infusing NS at 125ml/hr  Pain/nausea: Denies nausea. Reports pain at 8 throughout shift. Managed with prn pain medications (see MAR)  New changes this shift: Pt arrived from ED at approximately 0000. Left for surgery at approximately 0930 via wheelchair.  Plan: Clean out of abscess AM of 1/13, potentially discharge today or tomorrow.

## 2022-01-13 NOTE — PROGRESS NOTES
[facility]    Medicine Progress Note - Hospitalist Service       Date of Admission:  1/12/2022    Assessment & Plan            31 year old male with a past medical history of BAO admitted on 1/12/2022 for left submandibular and pterygomandibular space abscess s/p incision and drainage and extraction #17,18,32 and placement of 2x penrose drains.   -advance to soft diet as tolerated  -continue Unasyn and switch to augmentin at discharge for total of 7 days from today  -Peridex for 2 weeks  -No ice  - HOB>30 degrees  - F/U 1/18 at 11 AM  dentistry, 7th floor [facility] oral surgery clinic  - h/o IVDU, no use x 1 year  - HIV negative  - adequate pain control    Elevated BP:  Could be 2/2 pain and acute issues  No diagnosis of HTN  Has been elevated on previous visits  In the setting of IV decadron use    #Normocytic anemia: Hgb 12.1 w/ MCV of 96. Continue OP w/u.     #BAO: PTA maintained on Effexor and trazodone but does not take it regularly. Resume when able to take oral. Add PRN xanax.     Diet: Advance Diet as Tolerated: Full Liquid Diet    DVT Prophylaxis: Ambulate every shift  Lopez Catheter: Not present  Central Lines: None  Code Status: Full Code      Disposition Plan   Expected Discharge: 01/14/2022     Anticipated discharge location:  Awaiting care coordination huddle  Delays:            The patient's care was discussed with the Bedside Nurse, Patient and OMFS Consultant.    Sonja Dorantes MD  Hospitalist Service  [facility]  Securely message with the Vocera Web Console (learn more here)  Text page via [employer] Paging/Directory    Please see sign in/sign out for up to date coverage information    Clinically Significant Risk Factors Present on Admission                    ______________________________________________________________________    Interval History   Patient resting comfortably. ROS neg    Data reviewed today: I  reviewed all medications, new labs and imaging results over the last 24 hours.     Physical Exam   Vital Signs: Temp: 97.3  F (36.3  C) Temp src: Skin BP: (!) 154/90 Pulse: 72   Resp: 20 SpO2: 94 % O2 Device: None (Room air)    Weight: 0 lbs 0 oz  General Appearance: Well built and nourished, not in any acute cardio pulm distress  Respiratory: CTA b/l  Cardiovascular: S1S2 normal RRR  GI: soft NT  Skin: NAD  Other: aaox3 moving all 4 ext spont     Data   Recent Labs   Lab 01/13/22  0657 01/12/22  1229   WBC 18.0* 14.9*   HGB 12.2* 12.1*   MCV 95 96    343    138   POTASSIUM 4.5 4.7   CHLORIDE 103 110*   CO2 23 23   BUN 14 20   CR 0.72 0.86   ANIONGAP 8 5   BRANDON 9.4 8.8   * 112*     Recent Results (from the past 24 hour(s))   XR Chest 2 Views    Narrative    EXAM: XR CHEST 2 VW  1/12/2022 7:41 PM     HISTORY:  sternal edema, significant dental infx w/ cellulitis. eval  for underlying issue.       COMPARISON:  6/12/2018    FINDINGS:   PA and lateral views of the chest. Midline trachea. Cardiomediastinal  silhouette is within normal limits. No pneumothorax or pleural  effusion. No focal pulmonary opacity. Visualized upper abdomen is  unremarkable. No acute osseous abnormality.      Impression    IMPRESSION:   No acute cardiopulmonary findings.    I have personally reviewed the examination and initial interpretation  and I agree with the findings.    LOREN ESCOBAR MD         SYSTEM ID:  [id number]     Medications     sodium chloride 125 mL/hr at 01/12/22 1740       acetaminophen  975 mg Oral Q6H     ampicillin-sulbactam (UNASYN) IV  3 g Intravenous Q6H     polyethylene glycol  17 g Oral Daily     sodium chloride (PF)  3 mL Intracatheter Q8H

## 2022-01-13 NOTE — ANESTHESIA CARE TRANSFER NOTE
Patient: Marshall Yoon    Procedure: Procedure(s):  EXTRACTION, TOOTH 17 and 18 and 32 with incision and drainage, extraoral left       Diagnosis: Submandibular abscess [K12.2]  Diagnosis Additional Information: No value filed.    Anesthesia Type:   General     Note:    Oropharynx: oropharynx clear of all foreign objects and spontaneously breathing  Level of Consciousness: awake  Oxygen Supplementation: room air    Independent Airway: airway patency satisfactory and stable  Dentition: S/P dental procedure  Vital Signs Stable: post-procedure vital signs reviewed and stable  Report to RN Given: handoff report given  Patient transferred to: PACU  Comments: VSS, report given to RN.    Handoff Report: Identifed the Patient, Identified the Reponsible Provider, Reviewed the pertinent medical history, Discussed the surgical course, Reviewed Intra-OP anesthesia mangement and issues during anesthesia, Set expectations for post-procedure period and Allowed opportunity for questions and acknowledgement of understanding      Vitals:  Vitals Value Taken Time   /93 01/13/22 1108   Temp     Pulse 108 01/13/22 1110   Resp 12    SpO2 97 % 01/13/22 1110   Vitals shown include unvalidated device data.    Electronically Signed By: OZIEL Donaldson CRNA  January 13, 2022  11:11 AM

## 2022-01-13 NOTE — H&P
[facility]    History and Physical - Hospitalist Service, Gold Evening       Date of Admission:  1/12/2022    Assessment & Plan      Marshall Yoon is a 31 year old male with a past medical history of BAO admitted on 1/12/2022 for left submandibular, submental, sublingual, pterygomandibular, masseteric, and parapharyngeal space infection, likely odontogenic infection associated with #17 and #18.     #Left submandibular, submental, sublingual, pterygomandibular, masseteric, and parapharyngeal space infection, likely odontogenic infection associated with #17 and #18: CT scan w/ subperiosteal abscess extending along left body of mandible, 2x1.3 cm submandibular abscess, extensive inflammation/infection extends into left submandibular , buccal spaces w/o definite collection, extensive cellulitis in bilateral neck, L>R, edematous enlargement of retropharyngeal space. H/O IVDU, no use x1 year. HIV NR in 06/2021. WBC count of 14.9 w/ ANC Of 12.5, CRP elevated at 220, ESR of 45.  -OMFS consulted  -Blood cultures x2 ordered  -IV antibiotics w/ Unasyn  -Pain management w/ PRN IV dilaudid, PRN PO oxycodone, PRN IV toradol, scheduled tylenol  -Steroids w/ dex 6 mg Q6 hours  -IVF w/ NS at 125 ml/hour  -NPO today  -CXR given sternal edema  -HIV pending  -COVID ordered    #Elevated BP: BP elevated to 130s-150s/90s. Has been elevated in prior ED visits. No diagnosis of HTN. In pain due to above.   -Continue to monitor, treat pain, consider BP meds if no improvement    #Normocytic anemia: Hgb 12.1 w/ MCV of 96. Continue OP w/u.   #BAO: PTA maintained on Effexor and trazodone. Doesn't take regularly. Can hold for now.       Diet: NPO per Anesthesia Guidelines for Procedure/Surgery Except for: Meds    DVT Prophylaxis: Pneumatic Compression Devices and Ambulate every shift  Lopez Catheter: Not present  Central Lines: None  Code Status:   FULL    Clinically Significant Risk  Factors Present on Admission                    Disposition Plan   Expected Discharge:  2-3 days    Anticipated discharge location:  Awaiting care coordination huddle  Delays:     prior living arrangement pending antibiotic plan, post op surgical plan        The patient's care was discussed with the Attending Physician, Dr. Devlin, Bedside Nurse and Patient.    [facility]  Securely message with the Vocera Web Console (learn more here)  Text page via [facility] Paging/Directory        ______________________________________________________________________    Chief Complaint   Dental infection    History is obtained from the patient    History of Present Illness   Marshall Yoon is a 31 year old male with a past medical history of BAO admitted on 1/12/2022 for left submandibular, submental, sublingual, pterygomandibular, masseteric, and parapharyngeal space infection, likely odontogenic infection associated with #17 and #18.      The patient notes that at the beginning of January he started noticing pain, redness and swelling in left jaw. Has gotten progressively worse. Was seen in Urgency room on the 10th, started on antibiotics morning of the 11th. Unable to get into dentist, came to ER due to worsening pain and swelling. He noticed a bad taste in his mouth, but no obvious drainage. Has had sternal swelling and pain today. He denies dyspnea, throat tightening sensation or cough. No chills. No abdominal pain, chest pain, nausea/vomiting, diarrhea, rashes or edema.     Lives at home with mom. Consumes alcohol twice/week, up to 8 beers/time. Denies any h/o WD from alcohol. Notes a h/o IVDU, last use 1 year ago. Denies any recent use. Agreeable to HIV testing.     Review of Systems    The 10 point Review of Systems is negative other than noted in the HPI or here.     Past Medical History    I have reviewed this patient's medical history and updated  it with pertinent information if needed.   Past Medical History:   Diagnosis Date     Anxiety        Past Surgical History   I have reviewed this patient's surgical history and updated it with pertinent information if needed.  No past surgical history on file.    Social History   I have reviewed this patient's social history and updated it with pertinent information if needed.  Social History     Tobacco Use     Smoking status: Current Every Day Smoker     Packs/day: 0.25     Smokeless tobacco: Never Used   Substance Use Topics     Alcohol use: No     Drug use: Not on file       Family History     No significant family history.  Bipolar in mom per HE.     Prior to Admission Medications   Prior to Admission Medications   Prescriptions Last Dose Informant Patient Reported? Taking?   ALPRAZolam (XANAX) 0.5 MG tablet   No No   Sig: Take 1 tablet (0.5 mg) by mouth 3 times daily as needed for anxiety      Facility-Administered Medications: None     Allergies   No Known Allergies    Physical Exam   Vital Signs: Temp: 98.2  F (36.8  C)   BP: (!) 159/92 Pulse: 95   Resp: 16 SpO2: 97 % O2 Device: None (Room air)    Weight: 0 lbs 0 oz  GENERAL: Alert and oriented x 3. Lying comfortably in bed.   HEENT: Anicteric sclera. Mucous membranes moist and without lesions. Left facial pain and swelling, neck erythema, edema bilaterally extending into upper chest-mid sternum.   CV: RRR. S1, S2. No murmurs appreciated.   RESPIRATORY: Effort normal on RA. Lungs CTAB with no wheezing, rales, rhonchi.   GI: Abdomen soft and non distended, bowel sounds present. No tenderness, rebound, guarding.   MUSCULOSKELETAL: No joint swelling or tenderness. Moves all extremities.   NEUROLOGICAL: No focal deficits.   EXTREMITIES: No peripheral edema.   SKIN: No jaundice. No rashes.       Data   Data reviewed today: I reviewed all medications, new labs and imaging results over the last 24 hours. I personally reviewed the CT facial bones image(s) showing  dental abscess.    Reviewed CBC, CMP, ESR, CRP

## 2022-01-13 NOTE — ANESTHESIA POSTPROCEDURE EVALUATION
Patient: Marshall Yoon    Procedure: Procedure(s):  EXTRACTION, TOOTH 17 and 18 and 32 with incision and drainage, extraoral left       Diagnosis:Submandibular abscess [K12.2]  Diagnosis Additional Information: No value filed.    Anesthesia Type:  General    Note:  Disposition: Admission   Postop Pain Control: Uneventful            Sign Out: Well controlled pain   PONV:    Neuro/Psych: Uneventful            Sign Out: Acceptable/Baseline neuro status   Airway/Respiratory: Uneventful            Sign Out: Acceptable/Baseline resp. status   CV/Hemodynamics: Uneventful            Sign Out: Acceptable CV status; No obvious hypovolemia; No obvious fluid overload   Other NRE:    DID A NON-ROUTINE EVENT OCCUR?            Last vitals:  Vitals Value Taken Time   /96 01/13/22 1310   Temp 36.3  C (97.3  F) 01/13/22 1107   Pulse 90 01/13/22 1312   Resp 20 01/13/22 1245   SpO2 97 % 01/13/22 1315   Vitals shown include unvalidated device data.    Electronically Signed By: Lacey Hendrix MD  January 13, 2022  1:44 PM

## 2022-01-14 VITALS
SYSTOLIC BLOOD PRESSURE: 143 MMHG | RESPIRATION RATE: 16 BRPM | BODY MASS INDEX: 23.73 KG/M2 | OXYGEN SATURATION: 98 % | TEMPERATURE: 96.9 F | HEIGHT: 72 IN | HEART RATE: 64 BPM | DIASTOLIC BLOOD PRESSURE: 85 MMHG

## 2022-01-14 PROCEDURE — 250N000013 HC RX MED GY IP 250 OP 250 PS 637: Performed by: PHYSICIAN ASSISTANT

## 2022-01-14 PROCEDURE — 250N000013 HC RX MED GY IP 250 OP 250 PS 637: Performed by: INTERNAL MEDICINE

## 2022-01-14 PROCEDURE — 250N000011 HC RX IP 250 OP 636: Performed by: PHYSICIAN ASSISTANT

## 2022-01-14 RX ORDER — HYDROXYZINE PAMOATE 25 MG/1
25 CAPSULE ORAL 3 TIMES DAILY PRN
Qty: 60 CAPSULE | Refills: 0 | Status: ON HOLD | OUTPATIENT
Start: 2022-01-14 | End: 2022-06-15

## 2022-01-14 RX ORDER — OXYCODONE HYDROCHLORIDE 5 MG/1
5 TABLET ORAL EVERY 6 HOURS PRN
Qty: 10 TABLET | Refills: 0 | Status: ON HOLD | OUTPATIENT
Start: 2022-01-14 | End: 2022-06-15

## 2022-01-14 RX ADMIN — OXYCODONE HYDROCHLORIDE 5 MG: 5 TABLET ORAL at 06:31

## 2022-01-14 RX ADMIN — AMOXICILLIN AND CLAVULANATE POTASSIUM 1 TABLET: 875; 125 TABLET, FILM COATED ORAL at 07:40

## 2022-01-14 RX ADMIN — ACETAMINOPHEN 975 MG: 325 TABLET, FILM COATED ORAL at 02:00

## 2022-01-14 RX ADMIN — ALPRAZOLAM 0.5 MG: 0.5 TABLET ORAL at 04:19

## 2022-01-14 RX ADMIN — OXYCODONE HYDROCHLORIDE 5 MG: 5 TABLET ORAL at 02:00

## 2022-01-14 RX ADMIN — ACETAMINOPHEN 975 MG: 325 TABLET, FILM COATED ORAL at 07:40

## 2022-01-14 RX ADMIN — OXYCODONE HYDROCHLORIDE 5 MG: 5 TABLET ORAL at 10:29

## 2022-01-14 RX ADMIN — DEXAMETHASONE 6 MG: 2 TABLET ORAL at 09:52

## 2022-01-14 ASSESSMENT — ACTIVITIES OF DAILY LIVING (ADL)
ADLS_ACUITY_SCORE: 7

## 2022-01-14 NOTE — PROGRESS NOTES
ORAL & MAXILLOFACIALSURGERY   INPATIENT PROGRESS NOTE    Marshall Yoon   : 1990   Sex: male   MRN: [id number]                  2022 8:31 AM    ASSESSMENT:  Marshall Yoon is a 31 year old y.o. male s/p incision and drainage of left submandibular and pterygomandibular space abscess and extraction #17, 18, 32 on 2021 following expected post op course.  He is doing well and ok for discharge from OM perspective.     PLAN:  - From an OMS stand, the patient is stable and can be discharged  - Recommend discharge medication:      - Augmentin 875mg/125mg, 1 tablet PO q12h for 7 days      - Chlorhexidine 0.12%,mouth rinse, 473mL rinse with 15mL BID for 2 weeks  - Diet: Advance diet as tolerated   - Activity: ambulate ad kaiser. Encourage.   - Patient will follow up  at 11 am at [facility], 7th floor [facility] Oral Surgery clinic.   - Should the drain falls off before follow-up appointment, the patient can trim the dislodged drain with clean scissors. Do not cut suture.    Pt evaluated and discussed with Dr. Liz Perez, S PGY-3 resident and Dr. Miguel Thompson, S Chief Resident who will discuss the plan with Dr. Saleem Horn, attending.    Lisseth Nichols [facility] PGY-2 in OMS   Pager: [phone]  __________________________________________________________  SUBJECTIVE:  Marshall Yoon is a 31 year old y.o. male s/ps/p incision and drainage of left submandibular and pterygomandibular space abscess and extraction #17, 18, 32 on 2021. Patient found in bed. Patient reports doing well. Reports pain is controlled on current regimen. Tolerating diet. Denies nausea or vomiting, chest pain or shortness of breath, fever or chills. Is ambulating w/o problems. Is voiding well and reports flatulence  .  OBJECTIVE:  VITALS:  Patient Vitals for the past 24 hrs:   BP Temp Temp src Pulse Resp SpO2 Height   22 0401 (!) 143/85 96.9  F (36.1  C) Oral 64  16 98 % --   01/13/22 2300 (!) 149/89 97.3  F (36.3  C) Oral 82 16 96 % --   01/13/22 2016 (!) 145/89 (!) 95.7  F (35.4  C) Oral 83 16 98 % --   01/13/22 1757 (!) 147/103 -- -- 89 16 95 % --   01/13/22 1518 (!) 152/89 -- -- 100 12 94 % --   01/13/22 1450 (!) 161/96 -- -- 96 -- 95 % --   01/13/22 1415 (!) 171/96 -- -- 94 -- 95 % --   01/13/22 1400 (!) 154/80 -- -- 97 -- 94 % --   01/13/22 1340 (!) 140/89 -- -- 88 14 93 % --   01/13/22 1323 (!) 161/104 98.9  F (37.2  C) Axillary 99 20 93 % --   01/13/22 1315 -- -- -- -- -- 97 % --   01/13/22 1245 (!) 154/90 -- -- 72 20 94 % --   01/13/22 1230 (!) 141/80 -- -- 82 17 94 % --   01/13/22 1215 (!) 147/84 -- -- 90 18 93 % --   01/13/22 1200 (!) 147/86 -- -- 93 -- 94 % --   01/13/22 1145 (!) 163/101 -- -- 110 20 97 % --   01/13/22 1130 (!) 163/101 -- -- 108 17 93 % --   01/13/22 1115 (!) 174/103 -- -- 112 18 97 % --   01/13/22 1107 (!) 169/110 97.3  F (36.3  C) Skin 116 18 98 % --   01/13/22 0930 (!) 177/100 98.9  F (37.2  C) Oral 102 20 98 % 1.829 m (6')         Exam:  Neuro: alert, oriented, NAD, cranial nerves:  HEENT: head normocephalic, atraumatic, edema as expected on the left submandibular region. Minor dislodged of one drain but both drains remain inside the surgical site.   Oral: edema as expected, hemostasis achieved. the extraction site tooth #17,18,32, Sutures C/I, Hardware intact, mucosa appears well perfused with good capillary refill   Skin: no rashes or lesions. Hemostasis achieved at the incision site. Dressing/Sutures c/d/i.   CV: no JVD appreciated, RRR   Pulm: b/l = chest rise, breathing easily   Abd: non distended, non tender, soft   Ext: warm, well-perfused    Meds:  Current Facility-Administered Medications   Medication     acetaminophen (TYLENOL) tablet 975 mg     ALPRAZolam (XANAX) tablet 0.5 mg     amoxicillin-clavulanate (AUGMENTIN) 875-125 MG per tablet 1 tablet     hydrALAZINE (APRESOLINE) injection 5 mg     HYDROmorphone (PF) (DILAUDID)  injection 0.3-0.5 mg     ibuprofen (ADVIL/MOTRIN) tablet 600 mg     lidocaine (LMX4) cream     lidocaine 1 % 0.1-1 mL     melatonin tablet 1 mg     naloxone (NARCAN) injection 0.2 mg    Or     naloxone (NARCAN) injection 0.4 mg    Or     naloxone (NARCAN) injection 0.2 mg    Or     naloxone (NARCAN) injection 0.4 mg     ondansetron (ZOFRAN-ODT) ODT tab 4 mg    Or     ondansetron (ZOFRAN) injection 4 mg     oxyCODONE (ROXICODONE) tablet 5 mg     polyethylene glycol (MIRALAX) Packet 17 g     sodium chloride (PF) 0.9% PF flush 3 mL     sodium chloride (PF) 0.9% PF flush 3 mL     sodium chloride 0.9% infusion      Labs:  Admission on 01/12/2022   Component Date Value Ref Range Status     Sodium 01/12/2022 138  133 - 144 mmol/L Final     Potassium 01/12/2022 4.7  3.4 - 5.3 mmol/L Final     Chloride 01/12/2022 110* 94 - 109 mmol/L Final     Carbon Dioxide (CO2) 01/12/2022 23  20 - 32 mmol/L Final     Anion Gap 01/12/2022 5  3 - 14 mmol/L Final     Urea Nitrogen 01/12/2022 20  7 - 30 mg/dL Final     Creatinine 01/12/2022 0.86  0.66 - 1.25 mg/dL Final     Calcium 01/12/2022 8.8  8.5 - 10.1 mg/dL Final     Glucose 01/12/2022 112* 70 - 99 mg/dL Final     GFR Estimate 01/12/2022 >90  >60 mL/min/1.73m2 Final    Effective December 21, 2021 eGFRcr in adults is calculated using the 2021 CKD-EPI creatinine equation which includes age and gender (Amirah et al., NEJM, DOI: 10.1056/LYQFkh9401422)     CRP Inflammation 01/12/2022 220.0* 0.0 - 8.0 mg/L Final     Erythrocyte Sedimentation Rate 01/12/2022 45* 0 - 15 mm/hr Final     Radiologist flags 01/12/2022 Facial infection/abscess with for possible*  Final     WBC Count 01/12/2022 14.9* 4.0 - 11.0 10e3/uL Final     RBC Count 01/12/2022 3.88* 4.40 - 5.90 10e6/uL Final     Hemoglobin 01/12/2022 12.1* 13.3 - 17.7 g/dL Final     Hematocrit 01/12/2022 37.2* 40.0 - 53.0 % Final     MCV 01/12/2022 96  78 - 100 fL Final     MCH 01/12/2022 31.2  26.5 - 33.0 pg Final     MCHC 01/12/2022 32.5   31.5 - 36.5 g/dL Final     RDW 01/12/2022 12.8  10.0 - 15.0 % Final     Platelet Count 01/12/2022 343  150 - 450 10e3/uL Final     % Neutrophils 01/12/2022 83  % Final     % Lymphocytes 01/12/2022 7  % Final     % Monocytes 01/12/2022 8  % Final     % Eosinophils 01/12/2022 1  % Final     % Basophils 01/12/2022 1  % Final     % Immature Granulocytes 01/12/2022 0  % Final     NRBCs per 100 WBC 01/12/2022 0  <1 /100 Final     Absolute Neutrophils 01/12/2022 12.5* 1.6 - 8.3 10e3/uL Final     Absolute Lymphocytes 01/12/2022 1.0  0.8 - 5.3 10e3/uL Final     Absolute Monocytes 01/12/2022 1.2  0.0 - 1.3 10e3/uL Final     Absolute Eosinophils 01/12/2022 0.1  0.0 - 0.7 10e3/uL Final     Absolute Basophils 01/12/2022 0.1  0.0 - 0.2 10e3/uL Final     Absolute Immature Granulocytes 01/12/2022 0.1  <=0.4 10e3/uL Final     Absolute NRBCs 01/12/2022 0.0  10e3/uL Final     Hold Specimen 01/12/2022 [facility]   Final     Hold Specimen 01/12/2022 [facility]   Final     HIV Antigen Antibody Combo 01/12/2022 Nonreactive  Nonreactive Final    HIV-1 p24 Ag & HIV-1/HIV-2 Ab Not Detected     SARS CoV2 PCR 01/12/2022 Negative  Negative, Testing sent to reference lab. Results will be returned via unsolicited result Final    NEGATIVE: SARS-CoV-2 (COVID-19) RNA not detected, presumed negative.     Culture 01/12/2022 No growth after 1 day   Preliminary     Culture 01/12/2022 No growth after 1 day   Preliminary     WBC Count 01/13/2022 18.0* 4.0 - 11.0 10e3/uL Final     RBC Count 01/13/2022 3.93* 4.40 - 5.90 10e6/uL Final     Hemoglobin 01/13/2022 12.2* 13.3 - 17.7 g/dL Final     Hematocrit 01/13/2022 37.3* 40.0 - 53.0 % Final     MCV 01/13/2022 95  78 - 100 fL Final     MCH 01/13/2022 31.0  26.5 - 33.0 pg Final     MCHC 01/13/2022 32.7  31.5 - 36.5 g/dL Final     RDW 01/13/2022 12.4  10.0 - 15.0 % Final     Platelet Count 01/13/2022 412  150 - 450 10e3/uL Final     Sodium 01/13/2022 134  133 - 144 mmol/L Final     Potassium 01/13/2022 4.5  3.4 - 5.3 mmol/L  Final     Chloride 01/13/2022 103  94 - 109 mmol/L Final     Carbon Dioxide (CO2) 01/13/2022 23  20 - 32 mmol/L Final     Anion Gap 01/13/2022 8  3 - 14 mmol/L Final     Urea Nitrogen 01/13/2022 14  7 - 30 mg/dL Final     Creatinine 01/13/2022 0.72  0.66 - 1.25 mg/dL Final     Calcium 01/13/2022 9.4  8.5 - 10.1 mg/dL Final     Glucose 01/13/2022 136* 70 - 99 mg/dL Final     GFR Estimate 01/13/2022 >90  >60 mL/min/1.73m2 Final    Effective December 21, 2021 eGFRcr in adults is calculated using the 2021 CKD-EPI creatinine equation which includes age and gender (Amirah et al., NEJ, DOI: 10.1056/FBIYie8564757)     Gram Stain Result 01/13/2022 1+ Gram positive cocci*  Final     Gram Stain Result 01/13/2022 1+ WBC seen*  Final       Imaging:  Results for orders placed or performed during the hospital encounter of 01/12/22   Soft tissue neck CT w contrast     Value    Radiologist flags Facial infection/abscess with for possible (Urgent)    Narrative    CT SOFT TISSUE NECK W CONTRAST 1/12/2022 2:03 PM    History:  Odontogenic infection with submental spread      Comparison:  Outside CT 1/11/2022.     Technique: Following intravenous administration of nonionic iodinated  contrast medium, thin section helical CT images were obtained from the  skull base down to the level of the aortic arch.  Axial, coronal and  sagittal reformations were performed with 2-3 mm slice thickness  reconstruction. Images were reviewed in soft tissue, lung and bone  windows.    Contrast: 109 mL Isovue 370 mg/ml    Findings:   Subperiosteal abscess extending along the inferior margin of the body  of the left mandible (series 3 image 20) which appears to extend into  the left platysma muscle with a peripherally enhancing 2.0 x 1.3 cm  intramuscular component (series 2 image 54).   There is a large cavity in the left mandibular second molar tooth and  periapical dental disease of the left second and third mandibular  molars, likely the cause of above.  No additional suspicious bone  lesions. Paranasal sinuses and the mastoid air cells are clear.    The retropharyngeal space appears enlarged and containing  hypoattenuating fluid through the level of C4. No peripheral  enhancement. There is extensive edema throughout the left  submandibular, , and buccal spaces with enlargement of the  left submandibular glands, masseter muscle medial pterygoid muscle,  and left pharyngeal mucosa. No additional abscess is seen.  Inflammatory changes track down the anterior neck midline extending  through the visualized paramedian anterior chest.    Evaluation of remaining mucosal spaces demonstrates no abnormal mass  or enhancement. The thyroid gland is unremarkable.     There are numerous enlarged lymph nodes in the left neck without  definite necrosis. The major cervical vasculature appears patent.    The lung apices are clear.      Impression    Impression:  1. Subperiosteal abscess extending along the left body of the mandible  associated with periodontal disease from left mandibular second and  third molars. 2 x 1.3 cm submandibular abscess medication with the  subperiosteal abscess. Extensive inflammation/infection extends into  the left submandibular , buccal spaces without definite  additional abnormal collection. Extensive cellulitis in the bilateral  neck, left greater than right, extending down to the sternal notch.  2. Edematous enlargement of the retropharyngeal space. Likely reactive  in nature given the lack of peripheral contrast enhancement.  Prevertebral extension of infection cannot be completely excluded.    [Urgent Result: Facial infection/abscess with for possible  retropharyngeal involvement]    Finding was identified on 1/12/2022 2:11 PM.     Dr. Elliott was contacted by Dr. Skinner at 1/12/2022 2:32 PM and  verbalized understanding of the urgent finding.     I have personally reviewed the examination and initial interpretation  and I agree  with the findings.    EMILY GUZMÁN MD         SYSTEM ID:  [id number]   XR Chest 2 Views    Narrative    EXAM: XR CHEST 2 VW  1/12/2022 7:41 PM     HISTORY:  sternal edema, significant dental infx w/ cellulitis. eval  for underlying issue.       COMPARISON:  6/12/2018    FINDINGS:   PA and lateral views of the chest. Midline trachea. Cardiomediastinal  silhouette is within normal limits. No pneumothorax or pleural  effusion. No focal pulmonary opacity. Visualized upper abdomen is  unremarkable. No acute osseous abnormality.      Impression    IMPRESSION:   No acute cardiopulmonary findings.    I have personally reviewed the examination and initial interpretation  and I agree with the findings.    LOREN ESCOBAR MD         SYSTEM ID:  [id number]

## 2022-01-14 NOTE — DISCHARGE SUMMARY
[facility]  Hospitalist Discharge Summary      Date of Admission:  1/12/2022  Date of Discharge:  1/14/2022  Discharging Provider: Dar Zheng PA-C  Discharge Team: Hospitalist Service, Gold 6    Discharge Diagnoses   1. Left submandibular and pterygomandibular space abscess s/p I&D  2. S/p tooth extraction (#17, 18, 32)  3. Generalized anxiety disorder    Follow-ups Needed After Discharge   Follow-up Appointments     Adult [facility] Follow-up and recommended labs and tests      Follow up 1/18 at 11 AM [facility] dentistry, 7th floor [facility]   oral surgery clinic     Appointments on [place] and/or [facility] (with [facility] or [facility]   provider or service). Call [phone] if you haven't heard regarding   these appointments within 7 days of discharge.           Unresulted Labs Ordered in the Past 30 Days of this Admission     Date and Time Order Name Status Description    1/13/2022 10:42 AM Abscess Aerobic Bacterial Culture Routine Preliminary     1/13/2022 10:42 AM Anaerobic Bacterial Culture Routine In process     1/12/2022  9:57 PM Blood Culture Arm, Right Preliminary     1/12/2022  9:57 PM Blood Culture Hand, Left Preliminary       These results will be followed up by OMFS    Discharge Disposition   Discharged to home  Condition at discharge: Stable    Hospital Course   Marshall Yoon is a 31 year old male with PMHx of BAO who presented with progressive pain, erythema, and swelling of the left jaw. He was evaluated in urgent care and started on antibiotics prior to admission, however was unable to get into the dentist. He subsequently came to the ED for further evaluation where CT showed subperiosteal abscess extending along left body of mandible, submandibular abscess, and extensive inflammation/infection extending into left submandibular  and buccal spaces w/o definite collection, and extensive cellulitis in bilateral neck. WBC 14.9.  .     He was admitted to hospitalist service. He was initially treated with IV unasyn. OMFS was consulted and he underwent I&D of left submandibular and pterygomandibular space abscess with tooth extraction (#17, 18, 32) on 1/13/22 by Dr. Calvo. Post-operatively he was advanced to soft diet, which will be continued until seen by OMFS in follow up. He has a penrose drain in place. He was continued on dexamethasone during his hospital stay to help with inflammation and swelling. He clinically improved following drainage. He will discharge home in improving condition to complete a course of PO Augmentin. He will follow up with OMFS next week. He was instructed to keep penrose drain in place until follow up.     Consultations This Hospital Stay   CARE MANAGEMENT / SOCIAL WORK IP CONSULT    Code Status   Full Code    Time Spent on this Encounter   IDar PA-C, personally saw the patient today and spent greater than 30 minutes discharging this patient.       Dar Zheng PA-C  [facility] [address] [address]  Phone: [phone]  ______________________________________________________________________    Physical Exam   Vital Signs: Temp: 96.9  F (36.1  C) Temp src: Oral BP: (Abnormal) 143/85 Pulse: 64   Resp: 16 SpO2: 98 % O2 Device: None (Room air)    Weight: 0 lbs 0 oz  GENERAL:  Awake. Alert. NAD.    HEENT:  Mild left submandibular swelling. Dressings c/d/i. Drain present. No fluctuance, erythema. Mildly tender. Moist mucous membranes.   CV:  RRR. S1, S2. No murmur.   PULM:  Normal effort. CTAB.   GI:  Abdomen soft, non-distended. Active bowel sounds. No tenderness.    NEURO:  Grossly non-focal. Moves all extremities.    EXTREMITIES:  No peripheral edema. No calf tenderness.   SKIN:  Dry. No visible rash.        Primary Care Physician   Physician No Ref-Primary    Discharge Orders      Reason for your hospital stay    Submandibular abscess      Activity    Your activity upon discharge: activity as tolerated and no driving for today     Adult [facility] Follow-up and recommended labs and tests    Follow up 1/18 at 11 AM [facility] dentistry, 7th floor [facility] oral surgery clinic     Appointments on [place] and/or [facility] (with [facility] or [facility] provider or service). Call [phone] if you haven't heard regarding these appointments within 7 days of discharge.     Diet    Follow this diet upon discharge: Orders Placed This Encounter      Advance Diet as Tolerated: Regular Diet Adult         Discharge Medications   Current Discharge Medication List      START taking these medications    Details   amoxicillin-clavulanate (AUGMENTIN) 875-125 MG tablet Take 1 tablet by mouth every 12 hours for 7 days  Qty: 14 tablet, Refills: 0    Associated Diagnoses: Abscess of mandible      hydrOXYzine (VISTARIL) 25 MG capsule Take 1 capsule (25 mg) by mouth 3 times daily as needed for anxiety  Qty: 60 capsule, Refills: 0    Associated Diagnoses: Abscess of mandible      oxyCODONE (ROXICODONE) 5 MG tablet Take 1 tablet (5 mg) by mouth every 6 hours as needed for moderate to severe pain  Qty: 10 tablet, Refills: 0    Associated Diagnoses: Abscess of mandible         CONTINUE these medications which have NOT CHANGED    Details   cyclobenzaprine (FLEXERIL) 10 MG tablet Take 10 mg by mouth      ibuprofen (ADVIL/MOTRIN) 600 MG tablet Take 600 mg by mouth every 6 hours as needed for moderate pain      ALPRAZolam (XANAX) 0.5 MG tablet Take 1 tablet (0.5 mg) by mouth 3 times daily as needed for anxiety  Qty: 10 tablet, Refills: 0    Comments: DO NOT DRIVE IF USING THIS MEDICATION!      traZODone (DESYREL) 50 MG tablet          STOP taking these medications       clindamycin (CLEOCIN) 300 MG capsule Comments:   Reason for Stopping:             Allergies   No Known Allergies       Significant Results and Procedures   Results for orders placed or performed during the  hospital encounter of 01/12/22   Soft tissue neck CT w contrast     Value    Radiologist flags Facial infection/abscess with for possible (Urgent)    Narrative    CT SOFT TISSUE NECK W CONTRAST 1/12/2022 2:03 PM    History:  Odontogenic infection with submental spread      Comparison:  Outside CT 1/11/2022.     Technique: Following intravenous administration of nonionic iodinated  contrast medium, thin section helical CT images were obtained from the  skull base down to the level of the aortic arch.  Axial, coronal and  sagittal reformations were performed with 2-3 mm slice thickness  reconstruction. Images were reviewed in soft tissue, lung and bone  windows.    Contrast: 109 mL Isovue 370 mg/ml    Findings:   Subperiosteal abscess extending along the inferior margin of the body  of the left mandible (series 3 image 20) which appears to extend into  the left platysma muscle with a peripherally enhancing 2.0 x 1.3 cm  intramuscular component (series 2 image 54).   There is a large cavity in the left mandibular second molar tooth and  periapical dental disease of the left second and third mandibular  molars, likely the cause of above. No additional suspicious bone  lesions. Paranasal sinuses and the mastoid air cells are clear.    The retropharyngeal space appears enlarged and containing  hypoattenuating fluid through the level of C4. No peripheral  enhancement. There is extensive edema throughout the left  submandibular, , and buccal spaces with enlargement of the  left submandibular glands, masseter muscle medial pterygoid muscle,  and left pharyngeal mucosa. No additional abscess is seen.  Inflammatory changes track down the anterior neck midline extending  through the visualized paramedian anterior chest.    Evaluation of remaining mucosal spaces demonstrates no abnormal mass  or enhancement. The thyroid gland is unremarkable.     There are numerous enlarged lymph nodes in the left neck  without  definite necrosis. The major cervical vasculature appears patent.    The lung apices are clear.      Impression    Impression:  1. Subperiosteal abscess extending along the left body of the mandible  associated with periodontal disease from left mandibular second and  third molars. 2 x 1.3 cm submandibular abscess medication with the  subperiosteal abscess. Extensive inflammation/infection extends into  the left submandibular , buccal spaces without definite  additional abnormal collection. Extensive cellulitis in the bilateral  neck, left greater than right, extending down to the sternal notch.  2. Edematous enlargement of the retropharyngeal space. Likely reactive  in nature given the lack of peripheral contrast enhancement.  Prevertebral extension of infection cannot be completely excluded.    [Urgent Result: Facial infection/abscess with for possible  retropharyngeal involvement]    Finding was identified on 1/12/2022 2:11 PM.     Dr. Elliott was contacted by Dr. Skinner at 1/12/2022 2:32 PM and  verbalized understanding of the urgent finding.     I have personally reviewed the examination and initial interpretation  and I agree with the findings.    EMILY GUZMÁN MD         SYSTEM ID:  [id number]   XR Chest 2 Views    Narrative    EXAM: XR CHEST 2 VW  1/12/2022 7:41 PM     HISTORY:  sternal edema, significant dental infx w/ cellulitis. eval  for underlying issue.       COMPARISON:  6/12/2018    FINDINGS:   PA and lateral views of the chest. Midline trachea. Cardiomediastinal  silhouette is within normal limits. No pneumothorax or pleural  effusion. No focal pulmonary opacity. Visualized upper abdomen is  unremarkable. No acute osseous abnormality.      Impression    IMPRESSION:   No acute cardiopulmonary findings.    I have personally reviewed the examination and initial interpretation  and I agree with the findings.    LOREN ESCOBAR MD         SYSTEM ID:  [id number]   ,     Recent Labs   Lab  01/13/22  0657 01/12/22  1229    138   POTASSIUM 4.5 4.7   CHLORIDE 103 110*   CO2 23 23   ANIONGAP 8 5   BUN 14 20   CR 0.72 0.86   BRANDON 9.4 8.8     Recent Labs   Lab 01/13/22  0657 01/12/22  1229   WBC 18.0* 14.9*   RBC 3.93* 3.88*   HGB 12.2* 12.1*   HCT 37.3* 37.2*   MCV 95 96   MCH 31.0 31.2   MCHC 32.7 32.5   RDW 12.4 12.8    343     No lab results found in last 7 days.  Recent Labs   Lab 01/12/22  1229   .0*   SED 45*      No lab results found in last 7 days.  No lab results found in last 7 days.  Recent Labs   Lab 01/13/22  0657 01/12/22  1229   * 112*

## 2022-01-14 NOTE — PROVIDER NOTIFICATION
When brought in 2000 medications, IV decadron was ordered. Patient stated they pulled IV out because there was an order stating they no longer needed an IV. RN looked at orders and there is an order for discontinue IV prior to discharge since patient to be discharged by 11 AM tomorrow. MD notified and IV decadron changed to PO.

## 2022-01-14 NOTE — PLAN OF CARE
Status: admitted on 1/12/2022 for left submandibular and pterygomandibular space abscess s/p incision and drainage and extraction #17,18,32 and placement of 2x penrose drains  Vitals: HTN. PRN available for SBP >180 and MDs aware. Intermittent tachycardia in low 100s.   Neuros: Intact. Anxious at baseline, prn xanax available.  IV: PIV saline locked   Labs/Electrolytes: Pending cultures from I&D  Resp/trach: Denies SOB   Diet: regular as tolerated, stick to soft foods. No straws d/t extractions   Bowel status: last BM 1/13 prior to surg  : Voids without difficulty  Skin: Abscess site with 2 penrose drains with moderate amount of bloody/serosanguinous drainage. Dressing changed PRN.  Pain: Moderate pain, 4-5/10. Toradol, oxycodone, and tylenol utilized   Activity: Up ad kaiser   Plan: discharge by 11 tomorrow. Continue to monitor and follow POC.

## 2022-01-14 NOTE — PROGRESS NOTES
Care Management Follow Up    Length of Stay (days): 2    Expected Discharge Date: 01/14/2022     Concerns to be Addressed:  Transportation home    Patient plan of care discussed at interdisciplinary rounds: Yes    Anticipated Discharge Disposition:  Home    Referrals Placed by CM/SW:  [facility]  Private pay costs discussed: Not applicable    Additional Information:  In 6A Discharge Rounds it was reported plan is discharge today.   Informed by nurse, Nafisa, pt needs a ride home.    Introduced myself to pt and offered to arrange a ride home. He was alert, pleasant, up ad kaiser.   Confirmed pt's home/discharge address.   Confirmed pt's phone numbers on the facesheet. Home phone number is correct: [phone]. Other phone number is incorrect, [phone].   Pt's cell number is [phone]. Called Admissions and had information corrected on facesheet.     [facility] at [phone]. Scheduled taxi ride with [employer]. They can't come for an hour.   Nursing inquired later about status of transport. I tried calling [employer] at [phone], there was no answer. Online I found another number for [employer], [phone]. I called (this is not the main number); & he said the ride was in [place] and are on their way. Pt notified.       Nikole Gold, RN Care Coordinator  [address], [facility]  Phone:  [phone]    [employer]  Phone:  [phone]  Phone:  [phone]

## 2022-01-14 NOTE — PROVIDER NOTIFICATION
MD paged as when RN was in room one of the two penrose drains sutured to neck was pulled out of place.

## 2022-01-14 NOTE — PLAN OF CARE
Status: POD#1 incision and drainage of left submandibular and pterygomandibular space abscess and extraction #17, 18, 32 on 1/13/2021   Vitals: VSS  Neuros: Intact  IV: PIV removed  Labs/Electrolytes: WNL  Resp/trach: Lung sounds clear throguhout  Diet: Regular  Bowel status: BM today  : Voiding spontaneously  Skin: Left face swollen with penrose from left neck with serosang drainage  Pain: Neck/face pain controlled with Oxycodone and Tylenol  Activity: Independent  Social: Cooperative  With cares  Plan: Discharge instructions given to patient at bedside, educated patient on medications, follow up instructions patient received medications from discharge pharmacy, transportation arranged by care coordinator

## 2022-01-14 NOTE — PLAN OF CARE
[id number]  Status: admitted on 1/12/2022 for left submandibular and pterygomandibular space abscess s/p incision and drainage and extraction #17,18,32 and placement of 2x penrose drains  Vitals: VSS on RA ex HTN. PRN available for SBP >180. Intermittent tachycardia.  Neuros: Intact. Anxious at baseline, prn xanax available, given X2. 2 xanax doses left for today.  IV: No PIV, patient pulled in evening. MD paged and all IV medications changed to PO d/t discharge @ 11 this AM. See Provider notification note.  Labs/Electrolytes: Grain Stain results from 1/13 @ 1041 show WBC and gram positive cocci.  Resp/trach: Denies SOB   Diet: regular, stick to soft foods. No straws d/t extractions.  Bowel status: BS+, no BM this shift. LBM 1/13 prior to surgery.  : Voids spontaneously.  Skin: Abscess site with 2 penrose drains with moderate amount of bloody/serosanguinous drainage. Dressing changed PRN. See provider notification note @ 6410. MD stated no concern with penrose that was dislodged, said they would assess @ 0600.  Pain: Moderate pain controlled with PRN PO oxycodone and tylenol, and ibuprofen.  Activity: Up independently.  Plan: discharge by 11 today. Continue to monitor and follow POC.

## 2022-01-15 DIAGNOSIS — Z71.89 OTHER SPECIFIED COUNSELING: ICD-10-CM

## 2022-01-15 LAB
BACTERIA ABSC ANAEROBE+AEROBE CULT: ABNORMAL
BACTERIA ABSC ANAEROBE+AEROBE CULT: ABNORMAL
ENTEROCOCCUS FAECALIS: NOT DETECTED
ENTEROCOCCUS FAECIUM: NOT DETECTED
LISTERIA SPECIES (DETECTED/NOT DETECTED): NOT DETECTED
STAPHYLOCOCCUS AUREUS: NOT DETECTED
STAPHYLOCOCCUS EPIDERMIDIS: NOT DETECTED
STAPHYLOCOCCUS LUGDUNENSIS: NOT DETECTED
STAPHYLOCOCCUS SPECIES: NOT DETECTED
STREPTOCOCCUS AGALACTIAE: NOT DETECTED
STREPTOCOCCUS ANGINOSUS GROUP: NOT DETECTED
STREPTOCOCCUS PNEUMONIAE: NOT DETECTED
STREPTOCOCCUS PYOGENES: NOT DETECTED
STREPTOCOCCUS SPECIES: NOT DETECTED

## 2022-01-16 ENCOUNTER — PATIENT OUTREACH (OUTPATIENT)
Dept: CARE COORDINATION | Facility: CLINIC | Age: 32
End: 2022-01-16
Payer: COMMERCIAL

## 2022-01-16 NOTE — PROGRESS NOTES
Clinic Care Coordination Contact  [facility]/Voicemail       Clinical Data: Care Coordinator Outreach  Outreach attempted x 1. Unable to lvm    Plan: Care Coordinator will try to reach patient again in 1-2 business days.        DEREK Whiteside  [phone]  [facility]

## 2022-01-17 LAB — BACTERIA BLD CULT: NO GROWTH

## 2022-01-17 NOTE — PROGRESS NOTES
Clinic Care Coordination Contact  [facility]/Voicemail       Clinical Data: Care Coordinator Outreach    Outreach attempted x 2.  Unable to leave message on patient's voicemail with call back information, voicemail box not set up.    Plan:  Care Coordinator will do no further outreaches at this time.    Zuleyka Santiago, [facility]  [phone]  [facility]

## 2022-01-18 LAB
BACTERIA BLD CULT: ABNORMAL
BACTERIA BLD CULT: ABNORMAL

## 2022-01-20 LAB — BACTERIA ABSC ANAEROBE+AEROBE CULT: NORMAL

## 2022-01-29 ENCOUNTER — HEALTH MAINTENANCE LETTER (OUTPATIENT)
Age: 32
End: 2022-01-29

## 2022-06-09 ENCOUNTER — HOSPITAL ENCOUNTER (EMERGENCY)
Facility: CLINIC | Age: 32
Discharge: ANOTHER HOSPITAL | End: 2022-06-10
Attending: EMERGENCY MEDICINE | Admitting: EMERGENCY MEDICINE
Payer: COMMERCIAL

## 2022-06-09 DIAGNOSIS — F41.9 ANXIETY: ICD-10-CM

## 2022-06-09 DIAGNOSIS — R45.851 SUICIDAL IDEATION: ICD-10-CM

## 2022-06-09 DIAGNOSIS — F10.220 ACUTE ALCOHOLIC INTOXICATION IN ALCOHOLISM WITHOUT COMPLICATION (H): ICD-10-CM

## 2022-06-09 LAB
ANION GAP SERPL CALCULATED.3IONS-SCNC: 11 MMOL/L (ref 3–14)
BASOPHILS # BLD AUTO: 0.1 10E3/UL (ref 0–0.2)
BASOPHILS NFR BLD AUTO: 1 %
BUN SERPL-MCNC: 19 MG/DL (ref 7–30)
CALCIUM SERPL-MCNC: 9 MG/DL (ref 8.5–10.1)
CHLORIDE BLD-SCNC: 109 MMOL/L (ref 94–109)
CO2 SERPL-SCNC: 20 MMOL/L (ref 20–32)
CREAT SERPL-MCNC: 0.98 MG/DL (ref 0.66–1.25)
EOSINOPHIL # BLD AUTO: 0 10E3/UL (ref 0–0.7)
EOSINOPHIL NFR BLD AUTO: 0 %
ERYTHROCYTE [DISTWIDTH] IN BLOOD BY AUTOMATED COUNT: 14 % (ref 10–15)
ETHANOL SERPL-MCNC: 0.29 G/DL
GFR SERPL CREATININE-BSD FRML MDRD: >90 ML/MIN/1.73M2
GLUCOSE BLD-MCNC: 76 MG/DL (ref 70–99)
HCT VFR BLD AUTO: 42.8 % (ref 40–53)
HGB BLD-MCNC: 14.2 G/DL (ref 13.3–17.7)
HOLD SPECIMEN: NORMAL
IMM GRANULOCYTES # BLD: 0 10E3/UL
IMM GRANULOCYTES NFR BLD: 0 %
LYMPHOCYTES # BLD AUTO: 2.2 10E3/UL (ref 0.8–5.3)
LYMPHOCYTES NFR BLD AUTO: 21 %
MCH RBC QN AUTO: 30.1 PG (ref 26.5–33)
MCHC RBC AUTO-ENTMCNC: 33.2 G/DL (ref 31.5–36.5)
MCV RBC AUTO: 91 FL (ref 78–100)
MONOCYTES # BLD AUTO: 0.7 10E3/UL (ref 0–1.3)
MONOCYTES NFR BLD AUTO: 6 %
NEUTROPHILS # BLD AUTO: 7.6 10E3/UL (ref 1.6–8.3)
NEUTROPHILS NFR BLD AUTO: 72 %
NRBC # BLD AUTO: 0 10E3/UL
NRBC BLD AUTO-RTO: 0 /100
PLATELET # BLD AUTO: 227 10E3/UL (ref 150–450)
POTASSIUM BLD-SCNC: 4.7 MMOL/L (ref 3.4–5.3)
RBC # BLD AUTO: 4.72 10E6/UL (ref 4.4–5.9)
SODIUM SERPL-SCNC: 140 MMOL/L (ref 133–144)
WBC # BLD AUTO: 10.6 10E3/UL (ref 4–11)

## 2022-06-09 PROCEDURE — 250N000009 HC RX 250: Performed by: EMERGENCY MEDICINE

## 2022-06-09 PROCEDURE — C9803 HOPD COVID-19 SPEC COLLECT: HCPCS

## 2022-06-09 PROCEDURE — 85025 COMPLETE CBC W/AUTO DIFF WBC: CPT | Performed by: EMERGENCY MEDICINE

## 2022-06-09 PROCEDURE — 250N000011 HC RX IP 250 OP 636: Performed by: EMERGENCY MEDICINE

## 2022-06-09 PROCEDURE — 99285 EMERGENCY DEPT VISIT HI MDM: CPT | Mod: 25

## 2022-06-09 PROCEDURE — 80048 BASIC METABOLIC PNL TOTAL CA: CPT | Performed by: EMERGENCY MEDICINE

## 2022-06-09 PROCEDURE — 258N000003 HC RX IP 258 OP 636: Performed by: EMERGENCY MEDICINE

## 2022-06-09 PROCEDURE — 96365 THER/PROPH/DIAG IV INF INIT: CPT

## 2022-06-09 PROCEDURE — 82077 ASSAY SPEC XCP UR&BREATH IA: CPT | Performed by: EMERGENCY MEDICINE

## 2022-06-09 PROCEDURE — 36415 COLL VENOUS BLD VENIPUNCTURE: CPT | Performed by: EMERGENCY MEDICINE

## 2022-06-09 PROCEDURE — 96366 THER/PROPH/DIAG IV INF ADDON: CPT

## 2022-06-09 RX ADMIN — FOLIC ACID: 5 INJECTION, SOLUTION INTRAMUSCULAR; INTRAVENOUS; SUBCUTANEOUS at 21:52

## 2022-06-10 ENCOUNTER — TELEPHONE (OUTPATIENT)
Dept: BEHAVIORAL HEALTH | Facility: CLINIC | Age: 32
End: 2022-06-10
Payer: COMMERCIAL

## 2022-06-10 ENCOUNTER — HOSPITAL ENCOUNTER (INPATIENT)
Facility: CLINIC | Age: 32
LOS: 5 days | Discharge: HOME | DRG: 885 | End: 2022-06-15
Attending: PSYCHIATRY & NEUROLOGY | Admitting: PSYCHIATRY & NEUROLOGY
Payer: COMMERCIAL

## 2022-06-10 VITALS
RESPIRATION RATE: 15 BRPM | HEART RATE: 61 BPM | DIASTOLIC BLOOD PRESSURE: 95 MMHG | TEMPERATURE: 98.1 F | SYSTOLIC BLOOD PRESSURE: 149 MMHG | OXYGEN SATURATION: 99 %

## 2022-06-10 DIAGNOSIS — F10.21 ALCOHOL USE DISORDER, MODERATE, IN EARLY REMISSION (H): Primary | ICD-10-CM

## 2022-06-10 DIAGNOSIS — F41.1 GENERALIZED ANXIETY DISORDER: ICD-10-CM

## 2022-06-10 PROBLEM — R45.851 SUICIDAL IDEATION: Status: ACTIVE | Noted: 2022-06-10

## 2022-06-10 LAB
AMPHETAMINES UR QL SCN: ABNORMAL
BARBITURATES UR QL: ABNORMAL
BENZODIAZ UR QL: ABNORMAL
CANNABINOIDS UR QL SCN: ABNORMAL
COCAINE UR QL: ABNORMAL
OPIATES UR QL SCN: ABNORMAL
SARS-COV-2 RNA RESP QL NAA+PROBE: NEGATIVE

## 2022-06-10 PROCEDURE — 90791 PSYCH DIAGNOSTIC EVALUATION: CPT

## 2022-06-10 PROCEDURE — 124N000002 HC R&B MH UMMC

## 2022-06-10 PROCEDURE — 250N000013 HC RX MED GY IP 250 OP 250 PS 637: Performed by: PSYCHIATRY & NEUROLOGY

## 2022-06-10 PROCEDURE — U0005 INFEC AGEN DETEC AMPLI PROBE: HCPCS | Performed by: EMERGENCY MEDICINE

## 2022-06-10 PROCEDURE — 80307 DRUG TEST PRSMV CHEM ANLYZR: CPT | Performed by: EMERGENCY MEDICINE

## 2022-06-10 PROCEDURE — 250N000013 HC RX MED GY IP 250 OP 250 PS 637: Performed by: EMERGENCY MEDICINE

## 2022-06-10 RX ORDER — IBUPROFEN 200 MG
400 TABLET ORAL EVERY 6 HOURS PRN
COMMUNITY
End: 2022-11-18

## 2022-06-10 RX ORDER — ATENOLOL 50 MG/1
50 TABLET ORAL DAILY PRN
Status: DISCONTINUED | OUTPATIENT
Start: 2022-06-10 | End: 2022-06-15 | Stop reason: HOSPADM

## 2022-06-10 RX ORDER — BUSPIRONE HYDROCHLORIDE 5 MG/1
5 TABLET ORAL 2 TIMES DAILY
Status: DISCONTINUED | OUTPATIENT
Start: 2022-06-10 | End: 2022-06-15 | Stop reason: HOSPADM

## 2022-06-10 RX ORDER — TRAZODONE HYDROCHLORIDE 50 MG/1
50 TABLET, FILM COATED ORAL
Status: DISCONTINUED | OUTPATIENT
Start: 2022-06-10 | End: 2022-06-15 | Stop reason: HOSPADM

## 2022-06-10 RX ORDER — IBUPROFEN 200 MG
400 TABLET ORAL EVERY 6 HOURS PRN
Status: DISCONTINUED | OUTPATIENT
Start: 2022-06-10 | End: 2022-06-15 | Stop reason: HOSPADM

## 2022-06-10 RX ORDER — AMOXICILLIN 250 MG
1 CAPSULE ORAL 2 TIMES DAILY PRN
Status: DISCONTINUED | OUTPATIENT
Start: 2022-06-10 | End: 2022-06-15 | Stop reason: HOSPADM

## 2022-06-10 RX ORDER — OLANZAPINE 10 MG/1
10 TABLET ORAL 3 TIMES DAILY PRN
Status: DISCONTINUED | OUTPATIENT
Start: 2022-06-10 | End: 2022-06-15 | Stop reason: HOSPADM

## 2022-06-10 RX ORDER — OLANZAPINE 10 MG/2ML
10 INJECTION, POWDER, FOR SOLUTION INTRAMUSCULAR 3 TIMES DAILY PRN
Status: DISCONTINUED | OUTPATIENT
Start: 2022-06-10 | End: 2022-06-15 | Stop reason: HOSPADM

## 2022-06-10 RX ORDER — MAGNESIUM HYDROXIDE/ALUMINUM HYDROXICE/SIMETHICONE 120; 1200; 1200 MG/30ML; MG/30ML; MG/30ML
30 SUSPENSION ORAL EVERY 4 HOURS PRN
Status: DISCONTINUED | OUTPATIENT
Start: 2022-06-10 | End: 2022-06-15 | Stop reason: HOSPADM

## 2022-06-10 RX ORDER — BUSPIRONE HYDROCHLORIDE 5 MG/1
5 TABLET ORAL 2 TIMES DAILY
COMMUNITY
End: 2022-06-15

## 2022-06-10 RX ORDER — FOLIC ACID 1 MG/1
1 TABLET ORAL DAILY
Status: DISCONTINUED | OUTPATIENT
Start: 2022-06-11 | End: 2022-06-15 | Stop reason: HOSPADM

## 2022-06-10 RX ORDER — ACETAMINOPHEN 500 MG
1000 TABLET ORAL ONCE
Status: COMPLETED | OUTPATIENT
Start: 2022-06-10 | End: 2022-06-10

## 2022-06-10 RX ORDER — ACETAMINOPHEN 325 MG/1
650 TABLET ORAL EVERY 4 HOURS PRN
Status: DISCONTINUED | OUTPATIENT
Start: 2022-06-10 | End: 2022-06-15 | Stop reason: HOSPADM

## 2022-06-10 RX ORDER — DIAZEPAM 5 MG
5-20 TABLET ORAL EVERY 30 MIN PRN
Status: DISCONTINUED | OUTPATIENT
Start: 2022-06-10 | End: 2022-06-14

## 2022-06-10 RX ORDER — MULTIPLE VITAMINS W/ MINERALS TAB 9MG-400MCG
1 TAB ORAL DAILY
Status: DISCONTINUED | OUTPATIENT
Start: 2022-06-11 | End: 2022-06-15 | Stop reason: HOSPADM

## 2022-06-10 RX ORDER — HYDROXYZINE HYDROCHLORIDE 25 MG/1
25 TABLET, FILM COATED ORAL EVERY 4 HOURS PRN
Status: DISCONTINUED | OUTPATIENT
Start: 2022-06-10 | End: 2022-06-11

## 2022-06-10 RX ADMIN — ACETAMINOPHEN 1000 MG: 500 TABLET, FILM COATED ORAL at 00:30

## 2022-06-10 RX ADMIN — DIAZEPAM 5 MG: 5 TABLET ORAL at 22:39

## 2022-06-10 RX ADMIN — BUSPIRONE HYDROCHLORIDE 5 MG: 5 TABLET ORAL at 23:49

## 2022-06-10 ASSESSMENT — ACTIVITIES OF DAILY LIVING (ADL)
DRESS: INDEPENDENT
HEARING_DIFFICULTY_OR_DEAF: NO
LAUNDRY: UNABLE TO COMPLETE
HYGIENE/GROOMING: INDEPENDENT
CONCENTRATING,_REMEMBERING_OR_MAKING_DECISIONS_DIFFICULTY: NO
TOILETING_ISSUES: NO
HYGIENE/GROOMING: INDEPENDENT
CHANGE_IN_FUNCTIONAL_STATUS_SINCE_ONSET_OF_CURRENT_ILLNESS/INJURY: NO
ORAL_HYGIENE: INDEPENDENT
FALL_HISTORY_WITHIN_LAST_SIX_MONTHS: NO
DIFFICULTY_EATING/SWALLOWING: NO
DIFFICULTY_COMMUNICATING: NO
DOING_ERRANDS_INDEPENDENTLY_DIFFICULTY: NO
ORAL_HYGIENE: INDEPENDENT
DRESSING/BATHING_DIFFICULTY: NO
WALKING_OR_CLIMBING_STAIRS_DIFFICULTY: NO
WEAR_GLASSES_OR_BLIND: NO
DRESS: SCRUBS (BEHAVIORAL HEALTH)
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
## 2022-06-10 NOTE — ED NOTES
Report from [facility].    Security told patient on hold.    Pt easily arouses to voice. Answers appropriately. Awaiting DEC
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
## 2022-06-10 NOTE — ED PROVIDER NOTES
Patient received in signout from Dr. Cummins at shift change.  Patient awaiting inpatient psychiatric admission after presentation with acute intoxication and suicidal ideation.  Please see primary note for full details.  No acute events after shift change.  Patient subsequently signed out in stable condition awaiting admission.     Miguel Oviedo MD  06/10/22 1425

## 2022-06-10 NOTE — ED NOTES
Pushed code blue button multiple times despite reminders not to and to use call light. Patient ambulatory, steady on his feet. Appears clinically SOBER.

## 2022-06-10 NOTE — ED NOTES
Spoke with intake who stated they have a unit that may be able to accept patient, however, they need to staff a 1:1 sitter or the patient needs to contract for safety. Writer spoke to patient and explained what chaitanya for safety is and patient is agreeable to contract for safety. Intake updated

## 2022-06-10 NOTE — CONSULTS
6/9/2022  Marshall Yoon 1990     [facility] Crisis Assessment    Patient was assessed: remote  Patient location: [facility] ED  Was a release of information signed: No. Reason: patient refused    Referral Data and Chief Complaint  Marshall Yoon is a 31 year old who uses he/him/his pronouns. Patient presented to the ED via EMS and was referred to the ED by other: police. Patient is presenting to the ED for the following concerns: suicide attempt by laying in the road.      Informed Consent and Assessment Methods    Patient is his own decision maker.   Writer met with patient and explained the crisis assessment process, including applicable information disclosures and limits to confidentiality, assessed understanding of the process, and obtained consent to proceed with the assessment. Patient was observed to be able to participate in the assessment as evidenced by alert, eye contact, active engagement. Assessment methods included conducting a formal interview with patient, review of medical records, collaboration with medical staff, and obtaining relevant collateral information from family and community providers when available.    Narrative Summary   What led to the patient presenting for crisis services, factors that make the crisis life threatening or complex, stressors, how is this disrupting the patient's life, and how current functioning is in comparison to baseline. How is patient presenting during the assessment. Duration of the current crisis, coping skills attempted to reduce the crisis, community resources used, and past presentations.    Patient was laying in the road with suicidal intent while intoxicated. He was brought to the ED for evaluation.  On admission, his blood alcohol level was 0.29. Patient was assessed by DEC several hours later when patient was sober. He reported having been seen in the ED at [facility] on 6/8/2022 due to panic attacks where he was prescribed Buspar for  anxiety. Patient did not take the medication because he thought it was for depression instead of anxiety. On 6/9/2022, patient drank to intoxication and was found laying in the road. Patient told police that he was suicidal.     Previous diagnoses include Anxiety, Panic, Polysubstance Use Disorder and Meth Abuse. Patient reports one hospitalization at [facility] in 2017 or 2018.  He reports drinking alcohol 1-2 times per week. He denied history of DWI or ASUNCION treatment. He has no history of PHP, IOP, or commitment.  Patient has no history of psychotherapy.     Collateral Information  Epic and Care Everywhere were reviewed.   [facility] public access court records were reviewed.      Risk Assessment    Risk of Harm to Self     ESS-6  1.a. Over the past 2 weeks, have you had thoughts of killing yourself? Yes  1.b. Have you ever attempted to kill yourself and, if yes, when did this last happen? No   2. Recent or current suicide plan? Yes laying in the road.   3. Recent or current intent to act on ideation? Yes  4. Lifetime psychiatric hospitalization? Yes  5. Pattern of excessive substance use? Yes  6. Current irritability, agitation, or aggression? No  Scoring note: BOTH 1a and 1b must be yes for it to score 1 point, if both are not yes it is zero. All others are 1 point per number. If all questions 1a/1b - 6 are no, risk is negligible. If one of 1a/1b is yes, then risk is mild. If either question 2 or 3, but not both, is yes, then risk is automatically moderate regardless of total score. If both 2 and 3 are yes, risk is automatically high regardless of total score.     Score: 4, high risk    The patient has the following risk factors for suicide: substance abuse, depressive symptoms and poor impulse control    Is the patient experiencing current suicidal ideation: No    Is the patient engaging in preparatory suicide behaviors (formulating how to act on plan, giving away possessions, saying goodbye,  displaying dramatic behavior changes, etc)? No    Does the patient have access to firearms or other lethal means? no    The patient has the following protective factors: voluntarily seeking mental health support and expresses desire to engage in treatment    Support system information: Patient sometimes stays with his mother.      Patient strengths: Medical insurance coverage. Seeks help when needed.     Does the patient engage in non-suicidal self-injurious behavior (NSSI/SIB)? no    Is the patient vulnerable to sexual exploitation?  No    Is the patient experiencing abuse or neglect? no    Is the patient a vulnerable adult? No      Risk of Harm to Others  The patient has the following risk factors of harm to others: no risk factors identified    Does the patient have thoughts of harming others? No    Is the patient engaging in sexually inappropriate behavior?  no , however police reported that patient made sexually inappropriate comments about his children. They did not provide details.       Current Substance Abuse    Is there recent substance abuse? Yes, patient was intoxicated on admission with BAL of 0.29.    Was a urine drug screen or blood alcohol level obtained: Yes BAL.     CAGE AID  Have you felt you ought to cut down on your drinking or drug use?  Yes  Have people annoyed you by criticizing your drinking or drug use? Yes  Have you felt bad or guilty about your drinking or drug use? No  Have you ever had a drink or used drugs first thing in the morning to steady your nerves or to get rid of a hangover? No  Score: 2/4       Current Symptoms/Concerns    Symptoms  Attention, hyperactivity, and impulsivity symptoms present: Yes: Impulsive    Anxiety symptoms present: Yes: Panic attacks and Generalized Symptoms: Avoidance and Excessive worry      Appetite symptoms present: No     Behavioral difficulties present: No     Cognitive impairment symptoms present: Yes: Decision-Making and  Judgment/Insight    Depressive symptoms present: Yes Feelings of helplessness , Low self esteem , Sleep disturbance  and Thoughts of suicide/death      Eating disorder symptoms present: No    Learning disabilities, cognitive challenges, and/or developmental disorder symptoms present: No     Manic/hypomanic symptoms present: No    Personality and interpersonal functioning difficulties present : No    Psychosis symptoms present: No      Sleep difficulties present: Yes: Difficulty falling asleep  and Difficulty staying sleep     Substance abuse disorder symptoms present: Yes Cravings or strong desire to use, Recurrent substance use resulting in failure to fulfill major role obligations at school, work, or home, Continued substance use despite having persistent or recurrent social or interpersonal problems caused by or exacerbated by the use of substance(s) and Important social, occupational, or recreational activities are given up or reduced because of substance use     Trauma and stressor related symptoms present: Yes: Avoidance: Avoidance of memories, thoughts, or feelings  and Avoidance of external reminders       Mental Status Exam   Affect: Flat   Appearance: Appropriate    Attention Span/Concentration: Attentive?    Eye Contact: Engaged   Fund of Knowledge: Appropriate    Language /Speech Content: Fluent   Language /Speech Volume: Normal    Language /Speech Rate/Productions: Normal    Recent Memory: Intact   Remote Memory: Intact   Mood: Anxious    Orientation to Person: Yes    Orientation to Place: Yes   Orientation to Time of Day: Yes    Orientation to Date: Yes    Situation (Do they understand why they are here?): Yes    Psychomotor Behavior: Normal    Thought Content: Suicidal   Thought Form: Intact       Mental Health and Substance Abuse History    History  Current and historical diagnoses or mental health concerns: Anxiety, Panic, Polysubstance and Meth Abuse.     Prior MH services (inpatient, programmatic  care, outpatient, etc) : Yes inpatient at [facility] in 2017 or 2018.     Has the patient used [facility] crisis team services before?: No    History of substance abuse: Yes Alcohol, meth.    Prior ASUNCION services (inpatient, programmatic care, detox, outpatient, etc) : No    History of commitment: No    Family history of MH/ASUNCION: No    Trauma history: Yes Patient did not provide details.  He denied  service or abuse.     Medication  Psychotropic medications: Patient has been prescribed hydroxyzine in the past.  Yesterday, he was seen at [facility] ED for anxiety and panic.He was prescribed Buspar but did not take it.     Current Care Team  Primary Care Provider: No    Psychiatrist: No    Therapist: No    : No    CTSS or ARMHS: No    ACT Team: No    Other: No    Biopsychosocial Information    Socioeconomic Information  Current living situation: Homeless.  Patient stays at shelters or with his mother.    Employment/income source: Unemployed.      Relevant legal issues: None    Cultural, Pentecostal, or spiritual influences on mental health care: None identified.    Is the patient active in the  or a : No      Relevant Medical Concerns   Patient identifies concerns with completing ADLs? No     Patient can ambulate independently? Yes     Other medical concerns? No     History of concussion or TBI? No        Diagnosis  Panic disorder F41.0      Alcohol intoxication delirium, With moderate or severe use disorder F10.221      Generalized anxiety disorder F41.1      Therapeutic Intervention  The following therapeutic methodologies were employed when working with the patient: establishing rapport, active listening, assessing dimensions of crisis, solution focused brief therapy, identifying additional supports and alternative coping skills and safety planning. Patient response to intervention: calm, polite, active participation.      Disposition  Recommended disposition: Inpatient Mental Health       Reviewed case and recommendations with attending provider. Attending Name: Dr. Grubbs      Attending concurs with disposition: Yes      Patient concurs with disposition: Yes      Guardian concurs with disposition: NA     Final disposition: Inpatient mental health .     Inpatient Details (if applicable):  Is patient admitted voluntarily:Yes    Patient aware of potential for transfer if there is not appropriate placement? Yes     [facility]      Behavioral Intake Notified? Yes: Date: 6/10/2022 Time: 0600.       Clinical Substantiation of Recommendations   Rationale with supporting factors for disposition and diagnosis.       Patient with Anxiety, Panic Attacks, and substance use disorder was laying in the road as a suicide attempt while intoxicated. BAL on admission was 0.29. Patient agrees to an admission to inpatient mental health for safety and stabilization. Dr. Grubbs agrees with this plan.    Assessment Details  Patient interview started at: 0513 and completed at: 0544.    Total duration spent on the patient case in minutes: .50 hrs     CPT code(s) utilized: 61190 - Psychotherapy for Crisis - 60 (30-74*) min       Ijeoma Avalos LP

## 2022-06-10 NOTE — ED PROVIDER NOTES
History     Chief Complaint:  Alcohol Intoxication      HPI   Marshall Yoon is a 31 year old male who presents via EMS acutely intoxicated.  He was found lying in the middle of the road reporting to medics suicidal ideation.  Blood alcohol on  the scene was 0.24.  He is cooperative on arrival here.  He is reporting to me that he is not superman and needs help with his anxiety which is why he has been drinking alcohol.  He is denying additional drug use but does have a history of polysubstance abuse.  He is obviously intoxicated and unreliable.    Review of Systems   Unable to perform ROS: Mental status change     Allergies:  No Known Allergies    Medications:    Alprazolam  cyclobenzaprine   hydroxyzine  ibuprofen  oxycodone   trazodone   Buspirone    Past Medical History:    Anxiety  Abscess of mandible  Tobacco abuse  Vitamin D deficiency  BAO  Cellulitis of right arm  Heroin abuse  Alcohol abuse  Sepsis due to cellulitis related to IV drug use  Facial laceration    Past Surgical History:    Dental extractions  debridement    Social History:  History of polysubstance abuse  History of alcohol use/abuse    Physical Exam     Patient Vitals for the past 24 hrs:   BP Temp Pulse Resp SpO2   06/10/22 1430 -- -- -- -- 99 %   06/10/22 1415 -- -- -- -- 99 %   06/10/22 1400 -- -- -- -- 100 %   06/10/22 1345 -- -- -- -- 99 %   06/10/22 1330 -- -- -- -- 99 %   06/10/22 1315 -- -- -- -- 99 %   06/10/22 1300 -- -- -- -- 99 %   06/10/22 1245 -- -- -- -- 100 %   06/10/22 1230 -- -- -- -- 100 %   06/10/22 1215 -- -- -- -- 100 %   06/10/22 1200 -- -- -- -- 100 %   06/10/22 1145 -- -- -- -- 100 %   06/10/22 1130 -- -- -- -- 100 %   06/10/22 1115 -- -- -- -- 99 %   06/10/22 1105 -- -- -- -- 99 %   06/10/22 0930 -- -- -- -- 100 %   06/10/22 0929 -- -- -- -- 100 %   06/10/22 0916 -- -- -- -- 99 %   06/10/22 0908 131/78 -- 85 -- 100 %   06/10/22 0830 -- -- -- 18 --   06/10/22 0749 -- -- -- 16 --   06/10/22 0330 -- -- -- -- 100 %    06/10/22 0315 -- -- -- -- 99 %   06/10/22 0304 118/61 -- 65 16 100 %   06/09/22 2038 -- 98.2  F (36.8  C) -- -- --   06/09/22 2036 132/80 -- 92 18 100 %       Physical Exam  General: intoxicated.  Cooperative.   HENT:  No trauma.   Eyes: EOMI. Normal conjunctiva.  Neck:  Normal range of motion and appearance.   Cardiovascular:  Normal rate.   Pulmonary/Chest:  Effort normal.  Abdominal: Soft. No distension or tenderness.     Musculoskeletal: Normal range of motion. No edema or tenderness.   Neurological: oriented, normal strength, sensation, and coordination.   Skin: sunburned.   Psychiatric: intoxicated.       Emergency Department Course     Laboratory:  Labs Ordered and Resulted from Time of ED Arrival to Time of ED Departure   ETHYL ALCOHOL LEVEL - Abnormal       Result Value    Alcohol ethyl 0.29 (*)    BASIC METABOLIC PANEL - Normal    Sodium 140      Potassium 4.7      Chloride 109      Carbon Dioxide (CO2) 20      Anion Gap 11      Urea Nitrogen 19      Creatinine 0.98      Calcium 9.0      Glucose 76      GFR Estimate >90     COVID-19 VIRUS (CORONAVIRUS) BY PCR - Normal    SARS CoV2 PCR Negative     CBC WITH PLATELETS AND DIFFERENTIAL    WBC Count 10.6      RBC Count 4.72      Hemoglobin 14.2      Hematocrit 42.8      MCV 91      MCH 30.1      MCHC 33.2      RDW 14.0      Platelet Count 227      % Neutrophils 72      % Lymphocytes 21      % Monocytes 6      % Eosinophils 0      % Basophils 1      % Immature Granulocytes 0      NRBCs per 100 WBC 0      Absolute Neutrophils 7.6      Absolute Lymphocytes 2.2      Absolute Monocytes 0.7      Absolute Eosinophils 0.0      Absolute Basophils 0.1      Absolute Immature Granulocytes 0.0      Absolute NRBCs 0.0       Emergency Department Course:    Reviewed:  I reviewed nursing notes, vitals, past medical history, Care Everywhere and [facility]    Assessments:  2105 I obtained history and examined the patient as noted above.     Interventions:  Medications   sodium  chloride 0.9 % 1,000 mL with Infuvite Adult 10 mL, thiamine 100 mg, folic acid 1 mg infusion ( Intravenous Stopped 6/10/22 0247)   acetaminophen (TYLENOL) tablet 1,000 mg (1,000 mg Oral Given 6/10/22 0030)     Disposition:  Care of the patient was transferred to my colleague Dr. Grubbs pending reevaluation.     Impression & Plan    Medical Decision Makin-year-old male with a history of polysubstance and alcohol abuse arrives via EMS intoxicated after reportedly being found lying in the middle of the road and expressing suicidal ideation.  He is cooperative on arrival.  He is reporting a history of anxiety for which he states he desperately needs help.  He was placed on an ALEXYS and will require a period of observation in the emergency department until sobriety is achieved in the morning after which a DEC consult would be appropriate.  Screening labs have been ordered as well as an IV banana bag.    Diagnosis:    ICD-10-CM    1. Acute alcoholic intoxication in alcoholism without complication (H)  F10.220    2. Suicidal ideation  R45.851    3. Anxiety  F41.9        Scribe Disclosure:  Jennifer CALDWELL Hired, am serving as a scribe at 9:03 PM on 2022 to document services personally performed by Santhosh Harris MD based on my observations and the provider's statements to me.      Santhosh Harris MD  [phone]       Santhosh Harris MD  06/10/22 0438

## 2022-06-10 NOTE — PHARMACY-ADMISSION MEDICATION HISTORY
Admission medication history interview status for this patient is complete. See [facility] admission navigator for allergy information, prior to admission medications and immunization status.     Medication history interview done, indicate source(s): Patient  Medication history resources (including written lists, pill bottles, clinic record): Epic list, fill history  Pharmacy: [address], [place]    Changes made to PTA medication list:  Added: Buspirone  Changed: none  Reported as Not Taking: alprazolam, hydroxyzine, oxycodone, Flexeril  Removed: Flexeril    Actions taken by pharmacist (provider contacted, etc):None     Additional medication history information:None    Medication reconciliation/reorder completed by provider prior to medication history?  N   (Y/N)     Prior to Admission medications    Medication Sig Last Dose Taking? Auth Provider   busPIRone (BUSPAR) 5 MG tablet Take 5 mg by mouth 2 times daily 6/9/2022 at Unknown time Yes Unknown, Entered By History   ibuprofen (ADVIL/MOTRIN) 200 MG tablet Take 400 mg by mouth every 6 hours as needed for mild pain Unknown at Unknown time Yes Unknown, Entered By History   traZODone (DESYREL) 50 MG tablet Take 50 mg by mouth as needed More than a month at Unknown time Yes Reported, Patient   ALPRAZolam (XANAX) 0.5 MG tablet Take 1 tablet (0.5 mg) by mouth 3 times daily as needed for anxiety  Patient not taking: Reported on 6/10/2022 Not Taking at Unknown time  Chelo Espinosa MD   hydrOXYzine (VISTARIL) 25 MG capsule Take 1 capsule (25 mg) by mouth 3 times daily as needed for anxiety  Patient not taking: Reported on 6/10/2022 Not Taking at Unknown time  Harsh Tello MD   oxyCODONE (ROXICODONE) 5 MG tablet Take 1 tablet (5 mg) by mouth every 6 hours as needed for moderate to severe pain  Patient not taking: Reported on 6/10/2022 Not Taking at Unknown time  Harsh Tello MD

## 2022-06-10 NOTE — ED NOTES
"States had 6-7 pints of beer today. Difficult to get answers from keeps stating \"I need to get medical help.\"   "

## 2022-06-10 NOTE — TELEPHONE ENCOUNTER
R: Pt presented to Provider Annita for [facility] at 3:34PM    Provider would like an update on pt's current mental health status due to 0.29 MARIA LUISA upon admission to the ED.  Intake called ED for an update, pt's RN stated when the pt is seen by the MD, RN will call to update intake with pt's current situation and if SI or SIB are still being expressed.      Spoke to pt RN, pt's DEC assessment was completed when pt was sober.  Pt is still expressing SI.    Provider Annita accepts pt to unit [facility] at 4:20PM.    Pt added to unit que and unit called with disposition at 4:30PM    ED updated wit

## 2022-06-10 NOTE — PROGRESS NOTES
Triage & Transition Services, Extended Care     Marshall Yoon  Felisha 10, 2022    Marshall is followed related to Long wait time for admission: pt waiting over 18 hours for inpt. Please see initial DEC Crisis Assessment completed for complete assessment information. Medical record is reviewed. While patient is in the ED, care team is working towards Demonstrate Absence of Suicide Behavior for at least 24 hours.     There are not significant status changes. Full DEC Reassessment is not recommended at this time. Extended Care continues to be available for review of changes to initial crisis state resulting in this encounter.     Extended Care will follow and meet with patient/family/care team as able or requested.     Dar STODDARD [facility], Extended Care   [phone]

## 2022-06-11 LAB
CHOLEST SERPL-MCNC: 147 MG/DL
FOLATE SERPL-MCNC: 10.9 NG/ML
GGT SERPL-CCNC: 9 U/L (ref 0–75)
HDLC SERPL-MCNC: 51 MG/DL
LDLC SERPL CALC-MCNC: 70 MG/DL
NONHDLC SERPL-MCNC: 96 MG/DL
TRIGL SERPL-MCNC: 129 MG/DL
TSH SERPL DL<=0.005 MIU/L-ACNC: 1.16 MU/L (ref 0.4–4)
VIT B12 SERPL-MCNC: 590 PG/ML (ref 193–986)

## 2022-06-11 PROCEDURE — 82746 ASSAY OF FOLIC ACID SERUM: CPT | Performed by: PSYCHIATRY & NEUROLOGY

## 2022-06-11 PROCEDURE — 99223 1ST HOSP IP/OBS HIGH 75: CPT | Mod: AI | Performed by: PSYCHIATRY & NEUROLOGY

## 2022-06-11 PROCEDURE — 250N000013 HC RX MED GY IP 250 OP 250 PS 637: Performed by: PSYCHIATRY & NEUROLOGY

## 2022-06-11 PROCEDURE — 124N000002 HC R&B MH UMMC

## 2022-06-11 PROCEDURE — 84460 ALANINE AMINO (ALT) (SGPT): CPT | Performed by: PSYCHIATRY & NEUROLOGY

## 2022-06-11 PROCEDURE — 84443 ASSAY THYROID STIM HORMONE: CPT | Performed by: PSYCHIATRY & NEUROLOGY

## 2022-06-11 PROCEDURE — 80061 LIPID PANEL: CPT | Performed by: PSYCHIATRY & NEUROLOGY

## 2022-06-11 PROCEDURE — 82977 ASSAY OF GGT: CPT | Performed by: PSYCHIATRY & NEUROLOGY

## 2022-06-11 PROCEDURE — 36415 COLL VENOUS BLD VENIPUNCTURE: CPT | Performed by: PSYCHIATRY & NEUROLOGY

## 2022-06-11 PROCEDURE — 82607 VITAMIN B-12: CPT | Performed by: PSYCHIATRY & NEUROLOGY

## 2022-06-11 PROCEDURE — 84450 TRANSFERASE (AST) (SGOT): CPT | Performed by: PSYCHIATRY & NEUROLOGY

## 2022-06-11 RX ORDER — QUETIAPINE FUMARATE 100 MG/1
100 TABLET, FILM COATED ORAL AT BEDTIME
Status: DISCONTINUED | OUTPATIENT
Start: 2022-06-11 | End: 2022-06-14

## 2022-06-11 RX ORDER — QUETIAPINE FUMARATE 25 MG/1
25 TABLET, FILM COATED ORAL 3 TIMES DAILY PRN
Status: DISCONTINUED | OUTPATIENT
Start: 2022-06-11 | End: 2022-06-15 | Stop reason: HOSPADM

## 2022-06-11 RX ADMIN — FOLIC ACID 1 MG: 1 TABLET ORAL at 08:20

## 2022-06-11 RX ADMIN — BUSPIRONE HYDROCHLORIDE 5 MG: 5 TABLET ORAL at 08:20

## 2022-06-11 RX ADMIN — DIAZEPAM 5 MG: 5 TABLET ORAL at 12:41

## 2022-06-11 RX ADMIN — THIAMINE HCL TAB 100 MG 100 MG: 100 TAB at 08:20

## 2022-06-11 RX ADMIN — ACETAMINOPHEN 650 MG: 325 TABLET ORAL at 16:22

## 2022-06-11 RX ADMIN — BUSPIRONE HYDROCHLORIDE 5 MG: 5 TABLET ORAL at 20:38

## 2022-06-11 RX ADMIN — QUETIAPINE FUMARATE 25 MG: 25 TABLET ORAL at 16:22

## 2022-06-11 RX ADMIN — QUETIAPINE FUMARATE 100 MG: 100 TABLET ORAL at 20:38

## 2022-06-11 RX ADMIN — MULTIPLE VITAMINS W/ MINERALS TAB 1 TABLET: TAB at 08:20

## 2022-06-11 ASSESSMENT — ACTIVITIES OF DAILY LIVING (ADL)
HYGIENE/GROOMING: INDEPENDENT
ORAL_HYGIENE: INDEPENDENT
HYGIENE/GROOMING: HANDWASHING;INDEPENDENT
LAUNDRY: UNABLE TO COMPLETE
ORAL_HYGIENE: INDEPENDENT
DRESS: INDEPENDENT;SCRUBS (BEHAVIORAL HEALTH)
DRESS: SCRUBS (BEHAVIORAL HEALTH);INDEPENDENT
LAUNDRY: WITH SUPERVISION

## 2022-06-11 NOTE — PLAN OF CARE
06/10/22 2248   Valuables   Patient Belongings locker;sent to security per site process   Patient Belongings Put in Hospital Secure Location (Security or Locker, etc.) clothing;money (see comment);shoes;wallet;other (see comments)   Did you bring any home meds/supplements to the hospital?  No     NO CREDIT CARDS/KEYS UPON ARRIVAL TO UNIT    Items in patient locker:  Grey shoes  Grey tank top shirt  Underwear  Socks  Jeans  Leather belt  Bluetooth headphones  1 contact lens in packaging  Sunglasses  Paperwork  Black wallet containing; [place] drivers license, insurance cards    Items sent to security in envelope #[id number]:  Cash totaling $11.59          A               Admission:  I am responsible for any personal items that are not sent to the safe or pharmacy.  [facility] is not responsible for loss, theft or damage of any property in my possession.    Signature:  _________________________________ Date: _______  Time: _____                                              Staff Signature:  ____________________________ Date: ________  Time: _____      2nd Staff person, if patient is unable/unwilling to sign:    Signature: ________________________________ Date: ________  Time: _____     Discharge:  [facility] has returned all of my personal belongings:    Signature: _________________________________ Date: ________  Time: _____                                          Staff Signature:  ____________________________ Date: ________  Time: _____

## 2022-06-11 NOTE — PHARMACY-ADMISSION MEDICATION HISTORY
Please see Admission Medication History note completed on 6/10 under previous encounter at [facility] for information regarding prior to admission medications.    Amrita Cruz, Adwoa   [facility]: Ascom *[phone] or *[phone]

## 2022-06-11 NOTE — PLAN OF CARE
"Pt continues to report high anxiety. Pt requested PRN Seroquel and received 25mg PRN Seroquel about 1615 as well as PRN Tylenol for generalized pain/achiness. He was able to rest and napped after receiving these and reported the Tylenol was effective. He reports he his mood was \"better\" today. He denied having any thoughts of hurting himself, thoughts of death or suicide, thoughts of hurting others, or hallucinations of any kind. He was calm and cooperative throughout this shift. He was mostly isolative to his room, which is appropriate for current situation/mileu. He reports he showered on day shift. He ate well for dinner. He reports using the bathroom regularly. He had appropriate questions regarding the new medications he was prescribed (Seroquel and Buspar) and appears hopeful they will improve his mood and anxiety.     MSSA scores of 5 and 4, requiring no PRN medication per protocol.     "
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
## 2022-06-11 NOTE — PLAN OF CARE
"Goal Outcome Evaluation:    Plan of Care Reviewed With: patient     RN Assessment   Patient was isolative to self and the room most of the shift. Slept first half of the shift. Endorsed high anxiety today. Mood reported to be \"anxious\"  Medication adherent. States that he met with provider today and will be starting quetiapine. Was hopeful about medication changes. Denied suicidal ideation or thoughts of non-suicidal self injury. Denied auditory or visual perceptual abnormalities to this writer. MSSA 5 and 8, medicated per protocol. Hypertensive during noon assessment. No physical health complaints.   BP (!) 160/96 (Patient Position: Sitting, Cuff Size: Adult Large)   Pulse 98   Temp 97.2  F (36.2  C) (Oral)   Resp 16   SpO2 97%   Care plan was reviewed and updated today. Patient care was dicussed in team. Will continue with current plan and recommendations. Continue to monitor and reassess symptoms. Continue with current level of observation and precautions in place. See  notes for discharge planning.                  "
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
## 2022-06-11 NOTE — ED NOTES
Report called to [facility] Station 12 by Valery PATHAK RN during shift change. Pt informed of planned transfer and wait for EMS to facilitate transfer.

## 2022-06-11 NOTE — PLAN OF CARE
Problem: Sleep Disturbance  Goal: Adequate Sleep/Rest  Outcome: Ongoing, Progressing    Patient slept approximately 6.5 hours at night, no complaint of pain or discomfort. Schedule Buspar given at 2349, denies anxiety and depression. Patient MSSA score 3 and 4, no prn medication given. Heart rate 53 and bp 144/86.    BP (!) 144/86 (BP Location: Left arm, Patient Position: Sitting)   Pulse 53   Temp 98.4  F (36.9  C) (Oral)   Resp 16   SpO2 98%
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
## 2022-06-12 PROCEDURE — 124N000002 HC R&B MH UMMC

## 2022-06-12 PROCEDURE — 250N000013 HC RX MED GY IP 250 OP 250 PS 637: Performed by: PSYCHIATRY & NEUROLOGY

## 2022-06-12 RX ADMIN — MULTIPLE VITAMINS W/ MINERALS TAB 1 TABLET: TAB at 08:47

## 2022-06-12 RX ADMIN — BUSPIRONE HYDROCHLORIDE 5 MG: 5 TABLET ORAL at 08:47

## 2022-06-12 RX ADMIN — THIAMINE HCL TAB 100 MG 100 MG: 100 TAB at 08:47

## 2022-06-12 RX ADMIN — QUETIAPINE FUMARATE 25 MG: 25 TABLET ORAL at 16:52

## 2022-06-12 RX ADMIN — DIAZEPAM 5 MG: 5 TABLET ORAL at 16:52

## 2022-06-12 RX ADMIN — BUSPIRONE HYDROCHLORIDE 5 MG: 5 TABLET ORAL at 19:59

## 2022-06-12 RX ADMIN — QUETIAPINE FUMARATE 100 MG: 100 TABLET ORAL at 19:59

## 2022-06-12 RX ADMIN — FOLIC ACID 1 MG: 1 TABLET ORAL at 08:47

## 2022-06-12 RX ADMIN — QUETIAPINE FUMARATE 25 MG: 25 TABLET ORAL at 08:54

## 2022-06-12 ASSESSMENT — ACTIVITIES OF DAILY LIVING (ADL)
DRESS: INDEPENDENT
LAUNDRY: WITH SUPERVISION
ORAL_HYGIENE: INDEPENDENT
HYGIENE/GROOMING: INDEPENDENT

## 2022-06-12 NOTE — PLAN OF CARE
Pt awake at start of shift.     Breathing quiet and unlabored when sleeping.     Pt had no c/o pain or discomfort during the HS.     MSSA score of 2; requiring no PRN medication per protocol. Polite and cooperative with assessment.     Appears to have slept 7 hours.     Pt on SUICIDE, ASSAULT, and WITHDRAWAL precautions in addition to single room order. Any related events noted above.     Will continue to monitor and assess.   Goal Outcome Evaluation:    Problem: Sleep Disturbance  Goal: Adequate Sleep/Rest  Outcome: Ongoing, Progressing

## 2022-06-12 NOTE — PLAN OF CARE
Problem: Behavioral Health Plan of Care  Goal: Plan of Care Review  6/12/2022 1744 by Danielle Buenrostro, RN  Outcome: Ongoing, Progressing  6/12/2022 1731 by Danielle Buenrostro, RN  Outcome: Ongoing, Progressing   Goal Outcome Evaluation:        PT appeared calm and very complaint with care. Participated in group activty. MSSA SCORE @ 1645 = 9. Prn Valium was given for withdrawn and Prn Seroquel 25mg given per patient request for anxiety. Writer approached to education the Patient about medication. Ate dinner 100%. Patient showered after dinner. Schedule med given. Patient MSSA score @ 1950 3. /103 pulse 81 02 sat at room air is 98% T 96.7 continue to monitor Patient BP.  Pt complained anxiety rate 6/10,1950 Schedule HS med given to have with anxiety. Pt denied SI/ SIB/ AH. Pt is in bed with no concern noted at this time.

## 2022-06-12 NOTE — PLAN OF CARE
"  Problem: Suicidal Behavior  Goal: Suicidal Behavior is Absent or Managed  Outcome: Ongoing, Progressing     Problem: Activity and Energy Impairment (Excessive Substance Use)  Goal: Optimized Energy Level (Excessive Substance Use)  Intervention: Optimize Energy Level  Recent Flowsheet Documentation  Taken 6/12/2022 0900 by Isatu Doshi RN  Activity (Behavioral Health): up ad [facility]   Goal Outcome Evaluation:    Pt no visible in the milieu, he stayed in his room, most of the shift. Calm, cooperative with all cares, isolative, withdraw, flat affect. This morning pt reported anxiety 8/10, depression 4/10, and he requested PRN, Seroquel 25 mg PRN was given and was effective. Pt stated \" can I get the Seroquel three times a day without me asking like a schedule\" writer explain to the pt that he can ask his provider to change the PRN into a schedule and pt agreed. Eat 100% of his meal, no agitation or behavioral issues noted. MSSA score of 4, no signs of substance withdraw noted. Medication compliant, denies side effects, denies SI/ SIB/ HI. Independent ADL. Currently pt is in his room sleeping, will continue to monitor.       "

## 2022-06-13 LAB
ALT SERPL W P-5'-P-CCNC: 32 U/L (ref 0–70)
AST SERPL W P-5'-P-CCNC: 35 U/L (ref 0–45)

## 2022-06-13 PROCEDURE — 124N000002 HC R&B MH UMMC

## 2022-06-13 PROCEDURE — 250N000013 HC RX MED GY IP 250 OP 250 PS 637: Performed by: PSYCHIATRY & NEUROLOGY

## 2022-06-13 PROCEDURE — 99232 SBSQ HOSP IP/OBS MODERATE 35: CPT | Mod: GC | Performed by: PSYCHIATRY & NEUROLOGY

## 2022-06-13 RX ADMIN — BUSPIRONE HYDROCHLORIDE 5 MG: 5 TABLET ORAL at 21:49

## 2022-06-13 RX ADMIN — FOLIC ACID 1 MG: 1 TABLET ORAL at 08:47

## 2022-06-13 RX ADMIN — MULTIPLE VITAMINS W/ MINERALS TAB 1 TABLET: TAB at 08:47

## 2022-06-13 RX ADMIN — QUETIAPINE FUMARATE 100 MG: 100 TABLET ORAL at 21:49

## 2022-06-13 RX ADMIN — THIAMINE HCL TAB 100 MG 100 MG: 100 TAB at 08:47

## 2022-06-13 RX ADMIN — BUSPIRONE HYDROCHLORIDE 5 MG: 5 TABLET ORAL at 08:47

## 2022-06-13 RX ADMIN — OLANZAPINE 10 MG: 10 TABLET, FILM COATED ORAL at 18:48

## 2022-06-13 RX ADMIN — QUETIAPINE FUMARATE 25 MG: 25 TABLET ORAL at 14:48

## 2022-06-13 RX ADMIN — QUETIAPINE FUMARATE 25 MG: 25 TABLET ORAL at 08:50

## 2022-06-13 ASSESSMENT — ACTIVITIES OF DAILY LIVING (ADL)
DRESS: SCRUBS (BEHAVIORAL HEALTH)
ORAL_HYGIENE: INDEPENDENT
LAUNDRY: UNABLE TO COMPLETE
HYGIENE/GROOMING: INDEPENDENT

## 2022-06-13 NOTE — PLAN OF CARE
Pt asleep at start of shift.     Breathing quiet and unlabored when sleeping.     Pt had no c/o pain or discomfort during the HS.     Appears to have slept 6.75 hours.     MSSA:   0200-Sleeping. No s/sx of withdrawal.  0600-1    Pt on SUICIDE, ASSAULT, and WITHDRAWAL precautions in addition to single room order. Any related events noted above.     Will continue to monitor and assess.   Goal Outcome Evaluation:    Problem: Sleep Disturbance  Goal: Adequate Sleep/Rest  Outcome: Ongoing, Progressing

## 2022-06-13 NOTE — PROGRESS NOTES
"   06/12/22 2100   Group Therapy Session   Group Attendance attended group session   Time Session Began 1620   Time Session Ended 1705   Total Time patient participated (minutes) 45   Total # Attendees 1   Group Type psychotherapeutic   Group Topic Covered co-occurring illness   Group Session Detail 1:1   Patient Response/Contribution cooperative with task;discussed personal experience with topic;expressed readiness to alter behaviors;listened actively   Mood reported as anxious all of the time. He said he feels he has a \" brain injury.\". \"Something wrong with my brain.\" Lacked insight about substances contributing to his issues. He was pleasant, soft spoken , he kept referring to wanting to go to \" a group home.\" Writer encouraged him to write a list of thoughts he wants to discuss with his team on Monday which he did during the session to alleviate some of the anxiety he is feeling.  "

## 2022-06-13 NOTE — PLAN OF CARE
Nursing assessment completed.   MSSA 3  Patient reports continued anxiety and asks for and receives PRN seroquel X2, with stated relief. He cooperatively meets with the  several times and requests to go to a group home. A CD consult is ordered. Patient presents with a blunted affect, pleasant upon approach. No behavioral issues. Denies acute concerns. Denies SI/SIB. Medication compliant. Continue to monitor and assess.

## 2022-06-13 NOTE — PLAN OF CARE
06/13/22 1143   Interprofessional Rounds   Participants ;CTC;nursing;psychiatrist;social work   Team Discussion   Participants Tanika Ariza MD, Lois Simon (Resident), Joselin, YAZMIN, YAZMIN Rodney, Germaine, Nursing Supervisor, OUMAR Galvan   Progress Continuing to assess   Anticipated length of stay 5-7 days   Continued Stay Criteria/Rationale New Patient:   Medical/Physical No medical concerns noted at this time   Plan Attending psychiatrist will meet with patient daily to assess psychiatric needs and to discuss treatment options/adjust medications. While hospitalized patient will be encouraged to attend therapy groups and participate in unit programming. CTC will coordinate disposition and aftercare   Rationale for change in precautions or plan No change   Anticipated Discharge Disposition IRTS;substance use treatment   PRECAUTIONS AND SAFETY    Behavioral Orders   Procedures    Assault precautions    Code 1 - Restrict to Unit    Routine Programming     As clinically indicated    Status 15     Every 15 minutes.    Suicide precautions     Patients on Suicide Precautions should have a Combination Diet ordered that includes a Diet selection(s) AND a Behavioral Tray selection for Safe Tray - with utensils, or Safe Tray - NO utensils      Withdrawal precautions       Safety  Safety WDL: WDL  [facility]  Observed Behavior: sitting, calm  Observed Behavior (Comment):  (Patient is calm participate in activity.)  Safety Measures: self-directed behavior promoted  Suicidality: Status 15, Minimal furniture in room, Behavioral scrubs (pajamas), Minimal personal belongings in room  Withdrawal: monitor withdrawal process  Assault: status 15, behavioral scrubs (pajamas), minimal furniture in room, minimal personal belongings in room  Elopement: status 15, behavioral scrubs (pajamas)

## 2022-06-13 NOTE — PROGRESS NOTES
Spoke with CD  @ [facility] and she will contact patient tomorrow morning to complete the assessment via phone. Patient told writer he is unsure about CD treatment but wants a group home. CTC explained the process for group home is more complicated as [place] Choice Assessment would need to be completed before any referrals could be made to a group home.

## 2022-06-13 NOTE — PLAN OF CARE
Assessment/Intervention/Current Symtoms and Care Coordination  -Chart review  -Pre round meeting with team  -Rounded with team, addressed patient needs/concerns    Current Symptoms include the following: Patient isolative to room all day and did not attend groups: Pleasant and cooperative with writer during interview focused on CD treatment.     Discharge Plan or Goal  Pending stabilization & development of a safe discharge plan.  Considerations include:  Patient would like chemical dependency treatment and stated he tanisha discuss this with psychiatrist today.     Barriers to Discharge  Patient requires further psychiatric stabilization due to current symptomology    Referral Status  No referrals have been made this hospitalization    Legal Status  72 Hour Hold

## 2022-06-14 PROCEDURE — 124N000002 HC R&B MH UMMC

## 2022-06-14 PROCEDURE — 250N000013 HC RX MED GY IP 250 OP 250 PS 637: Performed by: PSYCHIATRY & NEUROLOGY

## 2022-06-14 PROCEDURE — 99232 SBSQ HOSP IP/OBS MODERATE 35: CPT | Mod: GC | Performed by: PSYCHIATRY & NEUROLOGY

## 2022-06-14 PROCEDURE — 250N000013 HC RX MED GY IP 250 OP 250 PS 637: Performed by: STUDENT IN AN ORGANIZED HEALTH CARE EDUCATION/TRAINING PROGRAM

## 2022-06-14 PROCEDURE — 999N000216 HC STATISTIC ADULT CD FACE TO FACE-NO CHRG

## 2022-06-14 RX ORDER — QUETIAPINE FUMARATE 100 MG/1
100 TABLET, FILM COATED ORAL 3 TIMES DAILY
Status: DISCONTINUED | OUTPATIENT
Start: 2022-06-14 | End: 2022-06-15 | Stop reason: HOSPADM

## 2022-06-14 RX ORDER — NALTREXONE HYDROCHLORIDE 50 MG/1
50 TABLET, FILM COATED ORAL DAILY
Status: DISCONTINUED | OUTPATIENT
Start: 2022-06-14 | End: 2022-06-15 | Stop reason: HOSPADM

## 2022-06-14 RX ADMIN — QUETIAPINE FUMARATE 100 MG: 100 TABLET ORAL at 13:18

## 2022-06-14 RX ADMIN — FOLIC ACID 1 MG: 1 TABLET ORAL at 08:36

## 2022-06-14 RX ADMIN — QUETIAPINE FUMARATE 25 MG: 25 TABLET ORAL at 08:38

## 2022-06-14 RX ADMIN — NALTREXONE HYDROCHLORIDE 50 MG: 50 TABLET, FILM COATED ORAL at 13:18

## 2022-06-14 RX ADMIN — BUSPIRONE HYDROCHLORIDE 5 MG: 5 TABLET ORAL at 08:38

## 2022-06-14 RX ADMIN — MULTIPLE VITAMINS W/ MINERALS TAB 1 TABLET: TAB at 08:37

## 2022-06-14 RX ADMIN — THIAMINE HCL TAB 100 MG 100 MG: 100 TAB at 08:36

## 2022-06-14 RX ADMIN — BUSPIRONE HYDROCHLORIDE 5 MG: 5 TABLET ORAL at 21:07

## 2022-06-14 RX ADMIN — QUETIAPINE FUMARATE 100 MG: 100 TABLET ORAL at 21:07

## 2022-06-14 ASSESSMENT — ACTIVITIES OF DAILY LIVING (ADL)
ORAL_HYGIENE: INDEPENDENT
LAUNDRY: UNABLE TO COMPLETE
DRESS: SCRUBS (BEHAVIORAL HEALTH);INDEPENDENT
HYGIENE/GROOMING: INDEPENDENT

## 2022-06-14 NOTE — PLAN OF CARE
"  Problem: Suicidal Behavior  Goal: Suicidal Behavior is Absent or Managed  Outcome: Ongoing, Progressing     Pt isolated to his room for most of the evening. He was out in the lounge for brief periods, but kept to himself. Affect blunted. He reports ongoing anxiety this evening that is unchanged from day shift. He requested prn Zyprexa for c/o agitation after dinner, which he states was \"somewhat\" helpful. /94, HR 97. MSSA 4.   Pt denies SI/SIB and HI. Endorses \"high anxiety\" as his main concern. Reports that the HS dose of Seroquel is more helpful than prns of Seroquel. States he would like to take Seroquel 100 mg tid. He was compliant with all scheduled medications and denied any side effects. No aggressive behavior observed. He was polite and calm all evening.   Pt had discussed signing in voluntary today when meeting with the treatment team. When asked about this, pt stated he felt apprehensive about signing voluntary paperwork and wanted to discuss it again tomorrow. States he is interested in finding a group home placement.  "

## 2022-06-14 NOTE — PROGRESS NOTES
"[facility], [place]   Psychiatric Progress Note  Hospital Day: 3          Interim History:   The patient's care was discussed with the treatment team and chart notes were reviewed.     Vital signs: BP (!) 154/94 (BP Location: Left arm, Patient Position: Sitting)   Pulse 97   Temp 97.7  F (36.5  C) (Tympanic)   Resp 16   Wt 92.3 kg (203 lb 8 oz)   SpO2 96%   BMI 27.60 kg/m    Sleep: 6.75 hours (06/13/22 0600)  Scheduled Medications: took all scheduled medications as prescribed   PRN medications: diazepam 5mg given at 1652 on 6/12 for MSSA of 9. Quetiapine 25mg given at 0854 and 1652 on 6/12 for anxiety.     Staff Report:  Patient did not report any acute medical concerns or side effects and vitals have been WNL with the exception of elevated blood pressure at 154/94. Patient is medication adherent. Staff report patient presents with a blunted affect, pleasant upon approach. No behavioral issues. Denies acute concerns.    Upon interview, Marshall reported he is feeling \"anxious\". He said that he has had a lot of anxiety and it seems to be getting worse. He can't pinpoint what exactly has been making it worse or the time course of his symptoms, but says that he \"just reached a point where I had to do something about it\". His anxiety manifests as tremors, chest tightness, shortness of breath, and sweatiness. It happens most frequently in social situations but also occurs \"all the time\". Sometimes he has what feels like a panic attack for a full day. He says that he had psychosis in 2016 after \"taking some kind of substance\" (further clarified that he doesn't know which substance) and was receiving care for psychosis through 2020. He says he has been feeling so anxious lately that he has been drinking more to help manage the anxiety, but that he doesn't want to drink. He estimates that he drinks about once/week though asks for CD assessment and treatment and seems to indicate that he " "drinks more than this. He had been treated with antidepressant medications in the past and says that they made his anxiety worse. He denies any history of humberto I.e. multiple days in a row with elevated energy, mood, and decreased need for sleep.     Marshall has unstable housing at this time and has been moving between different shelters. He is interested in getting a CD assessment and going to treatment though would prefer to be discharged to a shelter so that he can \"deal with some stuff\" in between hospitalization and treatment. Most specifically, he notes that he has a court hearing for a criminal issue (damage to property) on June 24 and wants to be able to do what he needs to do to prepare for that. We informed him that he will be able to leave treatment for a court hearing, but he still voiced that he would prefer to be discharged to a shelter before treatment. We discussed getting a CD assessment inpatient and getting a case management referral for the outpatient setting and he was agreeable to these referrals.    Suicidal ideation: denies current or recent suicidal ideation or behaviors.  Homicidal ideation: denies current or recent homicidal ideation or behaviors.  Psychotic symptoms:  Patient denies AH, VH, paranoia, delusions.     Medication side effects reported: No significant side effects.  Acute medical concerns: none    Other issues reported by patient:  Patient had no further questions or concerns.           Medications:       busPIRone  5 mg Oral BID     folic acid  1 mg Oral Daily     multivitamin w/minerals  1 tablet Oral Daily     QUEtiapine  100 mg Oral At Bedtime     thiamine  100 mg Oral Daily            Allergies:   No Known Allergies         Labs:   No results found for this or any previous visit (from the past 24 hour(s)).         Psychiatric Examination:     Appearance: awake, alert, adequately groomed and dressed in hospital scrubs   Oriented to:  time, person, and place  Attitude:  " cooperative  Eye Contact:  fair  Mood:  anxious  Affect:  mood congruent   Language: intact and fluent in English  Speech:  clear, coherent and normal prosody  Throught Process:  logical, linear and goal oriented  Associations:  no loose associations  Thought Content:  no evidence of suicidal ideation or homicidal ideation, no auditory hallucinations present and no visual hallucinations present  Psychomotor, Gait, Musculoskeletal:  no evidence of tardive dyskinesia, dystonia, or tics  Insight:  fair  Judgment:  fair   Impulse control: fair  Attention Span and Concentration:  fair  Recent and Remote Memory:  fair  Fund of Knowledge:  appropriate    Clinical Global Impressions  First:  Considering your total clinical experience with this particular patient population, how severe are the patient's symptoms at this time?: 7 6/10/2022  Compared to the patient's condition at the START of treatment, this patient's condition is: 4 6/10/2022  Most recent:  Considering your total clinical experience with this particular patient population, how severe are the patient's symptoms at this time?: 7 06/13/22  Compared to the patient's condition at the START of treatment, this patient's condition is: 4 06/13/22         Diagnoses:     Schizophrenia  Methamphetamine use disorder, moderate to severe  Alcohol use disorder, moderate            Assessment & Plan:   Today's changes:  - Referral for CD assessment  - Added on AST and ALT to previously obtained BMP  - Tomorrow, consider possible medication changes to address anxiety and/or cravings: increase quetiapine, increase buspirone, add naltrexone, or add prozac    Assessment:   Patient is a 31 year old male with past history of schizophrenia and methamphetamine use disorder.  Pt is very guarded in the interview, but appears paranoid and suspicious. Utox is positive for cocaine, and pt denies methamphetamine use, but could be contributing to his presentation.  Will start Seroquel to  help with sleep, anxiety and psychosis.     Hospital summary:  Marshall Yoon was admitted to station 12 under the care of Dr. Tanika Ariza on 6/10/2022 on a 72 hour hold. PTA medications including buspirone were continued at the time of admission. Quetiapine 100mg at bedtime + 25mg TID PRN for anxiety/psychosis was initiated on 6/11 to target anxiety related to possible psychotic symptoms and multivitamins + thiamine + folic acid were initiated in addition to [facility] with PRN diazepam for alcohol use disorder.     Target psychiatric symptoms and interventions:  # Anxiety unspecified  - Continue quetiapine 100mg at bedtime + 25mg TID PRN for anxiety/psychosis (started 6/11/22)  - Continue PTA buspirone 5mg BID    # Alcohol use disorder  - Continue multivitamin + thiamine + folic acid ordered on admission    Risks, benefits, and alternatives discussed at length with patient.     Acute Medical Problems and Treatments:  - No acute medical concerns    Behavioral/Psychological/Social:  - Encourage unit programming    Safety:  Behavioral Orders   Procedures     Assault precautions     Code 1 - Restrict to Unit     Routine Programming     As clinically indicated     Status 15     Every 15 minutes.     Suicide precautions     Patients on Suicide Precautions should have a Combination Diet ordered that includes a Diet selection(s) AND a Behavioral Tray selection for Safe Tray - with utensils, or Safe Tray - NO utensils       Withdrawal precautions       Legal Status: 72 hour hold    Disposition Plan   Reason for ongoing admission: poses an imminent risk to self  Discharge location: TBD  Discharge Medications: not ordered  Follow-up Appointments: not scheduled         The patient's plan of care was discussed with the attending psychiatrist.     Lois Simon MD, PhD  Psychiatry Resident, PGY-2   06/13/22

## 2022-06-14 NOTE — DISCHARGE INSTRUCTIONS
Behavioral Discharge Planning and Instructions  Summary: You were admitted on 6/10/2022 due to suicidal ideation, intoxication and increased anxiety. You were treated by Tanika Ariza MD and discharged on 6/15/22 from [facility] to his mother's house.    Main Diagnosis:   Schizophrenia  Methamphetamine Use Disorder, moderate to severe  Alcohol Use Disorder, moderate    Health Care Follow-up: Primary Care Follow-up Appointment:  [facility]:  Repeat Liver Function:     Appointment Date/Time: 6/22/22 @ 3:10pm: Primary Care Giver:  Denia Sinclair MD:  Address: [address] [place] [address] Kena Onofre [place]:   Phone Number: [phone]     A referral was made to request case management through [facility]:  Contact:  Flor @ [phone]     [facility]:  [address] [place] [address] [place]:  Hours 9 am - 9 pm:  # [phone]     Addiction Medicine/Medication Management Appointment:  In person appointment:   7/13/22 @ 8:30am: Provider/Resident Doctor:  Carlene Parker  [facility]:  [address] [place] [address]. [place]:   [facility] [address] Floor    Attend all scheduled appointments with your outpatient providers. Call at least 24 hours in advance if you need to reschedule an appointment to ensure continued access to your outpatient providers.     Major Treatments, Procedures and Findings:  You were provided with: a psychiatric assessment, assessed for medical stability, medication evaluation and/or management, group therapy, CD evaluation/assessment, and milieu management    Symptoms to Report: feeling more aggressive, increased confusion, losing more sleep, mood getting worse, or thoughts of suicide    Early warning signs can include: increased depression or anxiety sleep disturbances increased thoughts or behaviors of suicide or self-harm  increased unusual thinking, such as paranoia or hearing voices    Safety and Wellness:  Take all medicines as directed.   "Make no changes unless your doctor suggests them.      Follow treatment recommendations.  Refrain from alcohol and non-prescribed drugs.  Ask your support system to help you reduce your access to items that could harm yourself or others. Items could include:  Firearms  Medicines (both prescribed and over-the-counter)  Knives and other sharp objects  Ropes and like materials  Car keys  If there is a concern for safety, call 911. If there is a concern for safety, call 911.    Resources:   Crisis Intervention: 535.127.6485 or 330-763-8983 (TTY: 554.293.2011).  Call anytime for help.  National [place] on Mental Illness (www.[place].deb.org): 991.131.4631 or 926-476-0894.  Alcoholics Anonymous (www.alcoholics-anonymous.org): Check your phone book for your local chapter.  Suicide Awareness Voices of Education (SAVE) (www.save.org): 246-069-ADNS (0483)  National Suicide Prevention Line (www.mentalhealthmn.org): 583-123-OTPZ (3436)  Mental Health Consumer/Survivor Network of [place] (www.mhcsn.net): 766.196.3947 or 167-139-4425  Mental Health Association of [place] (www.mentalhealth.org): 384.509.6641 or 133-829-4849  Self- Management and Recovery Training., SMART-- Toll free: 571.242.5631  www.Cisiv.org  [facility] Crisis Response 482-535-9312  [facility] Crisis (COPE) Response - Adult 570 481-1027  Text 4 Life: txt \"LIFE\" to 70099 for immediate support and crisis intervention  Crisis text line: Text \"[place]\" to 829115. Free, confidential, 24/7.  Crisis Intervention: 292.190.5989 or 889-366-3152. Call anytime for help.     General Medication Instructions:   See your medication sheet(s) for instructions.   Take all medicines as directed.  Make no changes unless your doctor suggests them.   Go to all your doctor visits.  Be sure to have all your required lab tests. This way, your medicines can be refilled on time.  Do not use any drugs not prescribed by your doctor.  Avoid alcohol.    Advance Directives:   Scanned document on " file with [facility]? No scanned doc  Is document scanned? Pt states no documents  Honoring Choices Your Rights Handout: Informed and given  Was more information offered? Materials given    The Treatment team has appreciated the opportunity to work with you. If you have any questions or concerns about your recent admission, you can contact the unit which can receive your call 24 hours a day, 7 days a week. They will be able to get in touch with a Provider if needed. The unit number is [phone]

## 2022-06-14 NOTE — CONSULTS
6/14/2022    CD consult acknowledged and closing.  Pt was able to obtain a CD eval/services from Katalina CLARK (with [facility]).     Scarlet Fonseca, [facility] System[facility]LEY [email or web address]  Direct phone: [phone]

## 2022-06-14 NOTE — PROGRESS NOTES
"[facility], [place]   Psychiatric Progress Note  Hospital Day: 4          Interim History:   The patient's care was discussed with the treatment team and chart notes were reviewed.     Vital signs: /78 (BP Location: Left arm, Patient Position: Prone)   Pulse 51   Temp 97.8  F (36.6  C)   Resp 16   Wt 92.3 kg (203 lb 8 oz)   SpO2 98%   BMI 27.60 kg/m    Sleep: 7 hours (06/14/22 0600)  Scheduled Medications: took all scheduled medications as prescribed   PRN medications: PRN quetiapine 25mg x2 on 6/13 (0850 and 1448), x1 on morning of 6/14 (0838). PRN olanzapine 10mg at 1848 6/13 for agitation. No diazepam taken since 1652 on 6/12    Staff Report:  Patient did not report any acute medical concerns or side effects and vitals have been WNL. Patient is medication adherent. Staff report patient presents with a blunted affect, pleasant upon approach. No behavioral issues. Denies acute concerns. Has not scored above 8 on MSSA since 6/12. He reports that the quetiapine has been helpful for his anxiety, especially the 100mg dose he takes at night, and he would like to have higher doses available to him during the day.    Patient interview:  Upon interview, Marshall reported he is again feeling \"anxious\". We addressed his comments about quetiapine that he made to the nursing staff; he repeated that he finds it helpful for his anxiety, especially at the higher dose (100mg) that he gets at night. He doesn't think that it makes him sedated even at that dose, but it does help relieve the anxiety so that he can sleep. We suggested doing TID dosing with 50mg in the morning and afternoon, and then 100mg in the evening. He was open to this but emphasized that he really wanted 100mg three times during the day. We agreed to give this a try but asked him to please let us or the nursing staff know if he is feeling sedated. We also asked him to pay attention to whether he gets constipated. He pulled " out a list of questions he had written in a notebook, the first of which was related to anxiety medications. He asked about suboxone and we discussed that we had been considering naltrexone to help with his alcohol cravings, and that this would be preferable to suboxone given that he has no history of opioid dependence. We discussed also starting naltrexone at 50mg daily and he was agreeable to this. We said that it would be important for him to check in with his PCP in two weeks to check his liver function tests and he was able to repeat back this recommendation to confirm he understood. Another question he had was about discharge. We reminded him that we would recommend that he sign in voluntarily, as he had decided to wait after our discussion yesterday. He had some reservations which seemed to be related to a misunderstanding of what it meant to sign in voluntarily and we were able to clarify, so he did sign in. We also discussed the CD assessment; as he has [employer] insurance and typically sees providers in their system, our [facility] was able to set up an assessment with them over the phone, but when they called this morning he declined. He again seemed to have misconceptions about what it meant to do the assessment, as he was under the impression that the assessment would mean he automatically went straight to treatment and would not be able to be discharged to a shelter first. He is interested in going to a group home eventually and was hoping to have that option. We educated him on the nature of the process, namely that getting into a group home could take 3-6 months, and that we would recommend he go to CD treatment first while his outpatient  works on finding him a home. He would not necessarily need to go [id number] and has the right to refuse CD treatment if he decides he doesn't want to do it. After this discussion, he said he was willing to do the assessment. He said he was not having  any symptoms today other than anxiety, specifically denying SI, HI, auditory hallucinations, and paranoia. He said that he has never felt suicidal and said that he can act recklessly when he is drinking but doesn't think he ever really wanted to die. He had no other questions or concerns for the team and felt comfortable with the plan to do the CD assessment today, make medication changes as described above, and potentially discharge tomorrow.    Suicidal ideation: denies current or recent suicidal ideation or behaviors.  Homicidal ideation: denies current or recent homicidal ideation or behaviors.  Psychotic symptoms:  Patient denies AH, VH, paranoia, delusions.     Medication side effects reported: No significant side effects.  Acute medical concerns: none    Other issues reported by patient:  Patient had no further questions or concerns.           Medications:       busPIRone  5 mg Oral BID     folic acid  1 mg Oral Daily     multivitamin w/minerals  1 tablet Oral Daily     QUEtiapine  100 mg Oral At Bedtime     thiamine  100 mg Oral Daily            Allergies:   No Known Allergies         Labs:   No results found for this or any previous visit (from the past 24 hour(s)).         Psychiatric Examination:     Appearance: awake, alert, adequately groomed and dressed in hospital scrubs   Oriented to:  time, person, and place  Attitude:  cooperative  Eye Contact:  fair to poor  Mood:  anxious  Affect:  mood congruent   Language: intact and fluent in English  Speech:  clear, coherent and normal prosody  Thought Process:  Mostly linear and goal oriented. Some difficulty comprehending logic surrounding discharge planning  Associations:  no loose associations  Thought Content:  no evidence of suicidal ideation or homicidal ideation, no auditory hallucinations present and no visual hallucinations present  Psychomotor, Gait, Musculoskeletal:  no evidence of tardive dyskinesia, dystonia, or tics  Insight:  fair  Judgment:   fair   Impulse control: fair  Attention Span and Concentration:  fair  Recent and Remote Memory:  fair  Fund of Knowledge:  Appropriate to below average    Clinical Global Impressions  First:  Considering your total clinical experience with this particular patient population, how severe are the patient's symptoms at this time?: 7 6/10/2022  Compared to the patient's condition at the START of treatment, this patient's condition is: 4 6/10/2022  Most recent:  Considering your total clinical experience with this particular patient population, how severe are the patient's symptoms at this time?: 7 06/13/22  Compared to the patient's condition at the START of treatment, this patient's condition is: 4 06/13/22         Diagnoses:     Schizophrenia  Methamphetamine use disorder, moderate to severe  Alcohol use disorder, moderate            Assessment & Plan:   Today's changes:  - No diazepam taken since 1652 on 6/12. Patient has been scoring below threshold of 8 on MSSA since 6/12. Discontinue diazepam and MSSA order  - Patient signed in voluntarily  - Patient will complete CD assessment today  - Increase quetiapine from 100mg at bedtime to 100mg TID, monitor for sedation   - Initiate naltrexone 50mg daily; Patient will need PCP appointment in 2 weeks to check LFTs    Assessment:   Patient is a 31 year old male with past history of schizophrenia and methamphetamine use disorder.  Pt is very guarded in the interview, but appears paranoid and suspicious. Utox is positive for cocaine, and pt denies methamphetamine use, but could be contributing to his presentation.  Will start Seroquel to help with sleep, anxiety and psychosis.     Hospital summary:  Marshall Yoon was admitted to station 12 under the care of Dr. Tanika Ariza on 6/10/2022 on a 72 hour hold. PTA medications including buspirone were continued at the time of admission. Quetiapine 100mg at bedtime + 25mg TID PRN for anxiety/psychosis was initiated on 6/11  to target anxiety related to possible psychotic symptoms and multivitamins + thiamine + folic acid were initiated in addition to MSSA protocol with PRN diazepam for alcohol use disorder.     6/14: Patient signed in voluntarily and plans to complete a CD assessment today. We increased quetiapine from 100mg at bedtime (started on admission) to 100mg TID as the patient feels that it is helpful for his anxiety and denies sedation. Initiated naltrexone 50mg daily to target cravings. Possible discharge tomorrow    Target psychiatric symptoms and interventions:  # Anxiety unspecified  - Increase quetiapine from 100mg at bedtime to 100mg TID + 25mg TID PRN for anxiety/psychosis  - Continue PTA buspirone 5mg BID    # Alcohol use disorder  - Continue multivitamin + thiamine + folic acid ordered on admission  - Discontinue MSSA protocol 6/14  - Initiate naltrexone 50mg daily    Risks, benefits, and alternatives discussed at length with patient.     Acute Medical Problems and Treatments:  - No acute medical concerns    Behavioral/Psychological/Social:  - Encourage unit programming    Safety:  Behavioral Orders   Procedures     Assault precautions     Code 1 - Restrict to Unit     Routine Programming     As clinically indicated     Status 15     Every 15 minutes.     Suicide precautions     Patients on Suicide Precautions should have a Combination Diet ordered that includes a Diet selection(s) AND a Behavioral Tray selection for Safe Tray - with utensils, or Safe Tray - NO utensils       Withdrawal precautions       Legal Status: voluntary    Disposition Plan   Reason for ongoing admission: poses an imminent risk to self  Discharge location: TBD  Discharge Medications: not ordered  Follow-up Appointments: not scheduled         The patient's plan of care was discussed with the attending psychiatrist.     Lois Simon MD, PhD  Psychiatry Resident, PGY-2   06/14/22

## 2022-06-14 NOTE — PLAN OF CARE
Nursing assessment completed. Patient woke this morning and was cooperative with vitals, breakfast, and am medications. MSSA=7. Pt mostly scoring for pulse of 128. Does not appear to have other withdrawal symptoms. He is cooperative with am medications and requests PRN Seroquel for anxiety, which he states helps. He speaks to the  and initially declines to do his CD assessment, but latter agrees and states he initially did not understand what the assessment was. He did completed the CD assessment. Affect flat/blunted. Pleasant upon approach. Appears physically anxious. He starts scheduled Naltrexone this afternoon and scheduled Seroquel dose increased. He declines to attend group. Isolative throughout the shift. Did agree to sign in voluntarily today. Denies SI/SIB. Continue to monitor and assess.

## 2022-06-14 NOTE — PLAN OF CARE
Assessment/Intervention/Current Symtoms and Care Coordination    -Chart review    -Rounded with team, addressed patient needs/concerns: Patient completed CD Assessment with [facility]. Writer was informed by  that patient was very guarded and suspicious during her assessment.     Current Symptoms include the following: Patient isolative to room but pleasant and cooperative with writer.     Discharge Plan or Goal  Pending stabilization & development of a safe discharge plan.    Considerations include:  MICD program     Barriers to Discharge  Patient requires further psychiatric stabilization due to current symptomology    Referral Status  No referrals have been made this hospitalization    Legal Status  Voluntary

## 2022-06-15 VITALS
SYSTOLIC BLOOD PRESSURE: 128 MMHG | WEIGHT: 203.5 LBS | HEART RATE: 97 BPM | OXYGEN SATURATION: 98 % | BODY MASS INDEX: 27.6 KG/M2 | TEMPERATURE: 98.1 F | DIASTOLIC BLOOD PRESSURE: 80 MMHG | RESPIRATION RATE: 16 BRPM

## 2022-06-15 PROCEDURE — 99238 HOSP IP/OBS DSCHRG MGMT 30/<: CPT | Performed by: PSYCHIATRY & NEUROLOGY

## 2022-06-15 PROCEDURE — 250N000013 HC RX MED GY IP 250 OP 250 PS 637: Performed by: PSYCHIATRY & NEUROLOGY

## 2022-06-15 PROCEDURE — 250N000013 HC RX MED GY IP 250 OP 250 PS 637: Performed by: STUDENT IN AN ORGANIZED HEALTH CARE EDUCATION/TRAINING PROGRAM

## 2022-06-15 RX ORDER — FOLIC ACID 1 MG/1
1 TABLET ORAL DAILY
Qty: 30 TABLET | Refills: 0 | Status: SHIPPED | OUTPATIENT
Start: 2022-06-16 | End: 2022-07-16

## 2022-06-15 RX ORDER — QUETIAPINE FUMARATE 100 MG/1
100 TABLET, FILM COATED ORAL 3 TIMES DAILY
Qty: 90 TABLET | Refills: 0 | Status: SHIPPED | OUTPATIENT
Start: 2022-06-15 | End: 2022-11-18

## 2022-06-15 RX ORDER — BUSPIRONE HYDROCHLORIDE 5 MG/1
5 TABLET ORAL 2 TIMES DAILY
Qty: 60 TABLET | Refills: 0 | Status: SHIPPED | OUTPATIENT
Start: 2022-06-15 | End: 2022-11-18

## 2022-06-15 RX ORDER — LANOLIN ALCOHOL/MO/W.PET/CERES
100 CREAM (GRAM) TOPICAL DAILY
Qty: 30 TABLET | Refills: 0 | Status: SHIPPED | OUTPATIENT
Start: 2022-06-16 | End: 2022-07-16

## 2022-06-15 RX ORDER — MULTIPLE VITAMINS W/ MINERALS TAB 9MG-400MCG
1 TAB ORAL DAILY
Qty: 30 TABLET | Refills: 0 | Status: SHIPPED | OUTPATIENT
Start: 2022-06-16

## 2022-06-15 RX ORDER — NALTREXONE HYDROCHLORIDE 50 MG/1
50 TABLET, FILM COATED ORAL DAILY
Qty: 30 TABLET | Refills: 0 | Status: SHIPPED | OUTPATIENT
Start: 2022-06-16 | End: 2022-11-18

## 2022-06-15 RX ADMIN — QUETIAPINE FUMARATE 100 MG: 100 TABLET ORAL at 08:38

## 2022-06-15 RX ADMIN — FOLIC ACID 1 MG: 1 TABLET ORAL at 08:38

## 2022-06-15 RX ADMIN — BUSPIRONE HYDROCHLORIDE 5 MG: 5 TABLET ORAL at 08:37

## 2022-06-15 RX ADMIN — MULTIPLE VITAMINS W/ MINERALS TAB 1 TABLET: TAB at 08:38

## 2022-06-15 RX ADMIN — THIAMINE HCL TAB 100 MG 100 MG: 100 TAB at 08:37

## 2022-06-15 RX ADMIN — NALTREXONE HYDROCHLORIDE 50 MG: 50 TABLET, FILM COATED ORAL at 08:37

## 2022-06-15 ASSESSMENT — ACTIVITIES OF DAILY LIVING (ADL)
ORAL_HYGIENE: INDEPENDENT
DRESS: SCRUBS (BEHAVIORAL HEALTH)
HYGIENE/GROOMING: INDEPENDENT
LAUNDRY: UNABLE TO COMPLETE

## 2022-06-15 NOTE — PLAN OF CARE
Problem: Behavioral Health Plan of Care  Goal: Plan of Care Review  Outcome: Met  Flowsheets (Taken 6/15/2022 1300)  Plan of Care Reviewed With: patient  Patient Agreement with Plan of Care: agrees    Pt affect blunted. Reports his anxiety is improved with increase in Seroquel. He denies SI/SIB and HI. After meeting with treatment team, discharge was scheduled for this afternoon.  Pt discharged to his Mom's house via cab. Pt agrees with all discharge and medication instructions. 30 day supply of medications was sent with pt.   Pt reports feeling safe to discharge and denies SI/SIB. All belongings returned to pt at time of discharge.

## 2022-06-15 NOTE — PLAN OF CARE
Pt asleep at start of shift.     Breathing quiet and unlabored when sleeping.     Pt had no c/o pain or discomfort during the HS.     Appears to have slept 7 hours.     Pt on SUICIDE, ASSAULT, and WITHDRAWAL precautions in addition to single room order. Any related events noted above.     Will continue to monitor and assess.     Goal Outcome Evaluation:    Problem: Sleep Disturbance  Goal: Adequate Sleep/Rest  Outcome: Ongoing, Progressing

## 2022-06-15 NOTE — PLAN OF CARE
The pt was isolated in his room the entire shift. Upon approach, he was calm, cooperative, and friendly. He denied SI/SIB/HI and all other mental health symptoms besides anxiety. He rated his anxiety 5/110 and reported that Seroquel was very helpful. He denied any lethargy or constipation from the Seroquel. He ate 100% of his dinner and snack. He deneid anypain or dicomfort. All scheduled medications were given with no issue and no PRNs were given this shift. He received his first dose of Buspirone and was receptive to the medication education. Vitals WDL. No further concerns at this time. Continue to monitor and assess.   Problem: Behavioral Health Plan of Care  Goal: Plan of Care Review  Outcome: Ongoing, Progressing  Flowsheets  Taken 6/14/2022 2209  Plan of Care Reviewed With: patient  Taken 6/14/2022 1613  Plan of Care Reviewed With: patient  Patient Agreement with Plan of Care: agrees

## 2022-06-16 ENCOUNTER — PATIENT OUTREACH (OUTPATIENT)
Dept: CARE COORDINATION | Facility: CLINIC | Age: 32
End: 2022-06-16
Payer: COMMERCIAL

## 2022-06-16 DIAGNOSIS — Z71.89 OTHER SPECIFIED COUNSELING: ICD-10-CM

## 2022-06-16 NOTE — PROGRESS NOTES
Clinic Care Coordination Contact  [facility]/Voicemail       Clinical Data: Care Coordinator Outreach  Outreach attempted x 1.  Unable to leave message on patient's voicemail with call back information.  Plan: Care Coordinator will try to reach patient again in 1-2 business days.    TAMMY Owens  [facility]  [facility]     *Connected Care Resource Team does NOT follow patient ongoing. Referrals are identified based on internal discharge reports and the outreach is to ensure patient has an understanding of their discharge instructions.

## 2022-06-17 NOTE — DISCHARGE SUMMARY
"Psychiatric Discharge Summary    aMrshall Yoon MRN# [id number]   Age: 31 year old YOB: 1990     Date of Admission:  6/10/2022  Date of Discharge:  6/15/2022  1:50 PM  Admitting Physician:  Romario Saunders MD  Discharge Physician:  Tanika Ariza MD         Events Leading to Hospitalization:   HPI at initial presentation per ER/A&R notes:   Patient was laying in the road with suicidal intent while intoxicated. He was brought to the ED for evaluation.  On admission, his blood alcohol level was 0.29. Patient was assessed by DEC several hours later when patient was sober. He reported having been seen in the ED at [facility] on 6/8/2022 due to panic attacks where he was prescribed Buspar for anxiety. Patient did not take the medication because he thought it was for depression instead of anxiety. On 6/9/2022, patient drank to intoxication and was found laying in the road. Patient told police that he was suicidal.      Previous diagnoses include Anxiety, Panic, Polysubstance Use Disorder and Meth Abuse. Patient reports one hospitalization at [facility] mental health in 2017 or 2018.  He reports drinking alcohol 1-2 times per week. He denied history of DWI or ASUNCION treatment. He has no history of PHP, IOP, or commitment.  Patient has no history of psychotherapy.      Today, patient reports severe anxiety his whole life, but has become very severe in the last year.  Pt said that he has severe shaking, shortness of breath and \"Feels like my brain is getting pinched and I can't think.\"   Pt said that normally he doesn't have SI, but did on admission due to intoxication.  Pt said he sometimes can't enjoy certain things if he's anxious, but denies feeling depression.      Pt said that he just started BuSpar yesterday in the emergency department.  Pt said that he has discontinued antidepressants in the past due to bad reactions.        See Admission note by Romario Saunders MD on 6/10/2022 " for additional details.          Diagnoses:     Schizophrenia  Methamphetamine use disorder, moderate to severe  Alcohol use disorder, moderate         Consults:     Consult was placed for chemical dependency assessment. Patient was able to obtain an assessment through [facility] while inpatient         Hospital Course:   Marshall Yoon was admitted to [facility] with attending Dr. Tanika Ariza on a 72 hour mental health hold. He signed in voluntarily on 6/14/22. The patient was placed under status 15 (15 minute checks) to ensure patient safety.   MSSA protocol was initiated due to the patient's history of alcohol abuse and concern for withdrawal symptoms. After he had gone more than 48 hours without scoring above threshold on MSSA and without reporting symptoms of withdrawal, MSSA protocol was discontinued.    Psychiatric Course:   1. Medication Trials and Changes: Quetiapine 100mg at bedtime + 25mg TID PRN for anxiety/psychosis was initiated on 6/11 to target anxiety related to possible psychotic symptoms and multivitamins + thiamine + folic acid were initiated in addition to MSSA protocol with PRN diazepam for alcohol use disorder. MSSA protocol discontinued on 6/14. He required a total of 15 mg of Valium over the course of three days, last dose given on 6/12. Also on 6/14, we increased quetiapine from 100mg at bedtime (started on admission) to 100mg TID as the patient feels that it is helpful for his anxiety and paranoia, and denies sedation or other side effects. Initiated naltrexone 50mg daily to target alcohol cravings with goal of reducing risk of relapse. The patient had been initiated on buspirone 5mg BID during an ED visit the day before admission to our hospital (6/8/22) and we continued this during his stay.   2. Recommendations for ongoing care: If the patient continues to experience heightened anxiety, we would recommend increasing the dose of buspirone. He seemed to tolerate the  "quetiapine 100mg TID well while inpatient but if he starts experiencing sedation this may need to be reduced in dose. Due to the initiation of naltrexone, he will require PCP checkup with LFTs in 2 weeks. This has been scheduled for him.  3. Medication adherence: adherent  4. Change in psychiatric symptoms: Over the course of this hospitalization the patient's symptoms of anxiety improved.  5. Behaviors and group Attendance: The patient did not require chemical/physical restraints during admission. The patient was safe and appropriate. He was cooperative with cares and had good group attendance.   6. Legal Status: 72-hour hold then voluntary starting 6/14    Medical Course: Marshall Yoon was medically cleared by the ED prior to admission to the unit. PTA medications were continued. Admission labs were notable for the following:  - CMP normal  - CBC normal  - TSH normal  - Vitamin B12 normal  - UDS Positive for cocaine, otherwise not detected   - Blood alcohol level in ED on 6/9 was 0.29  - COVID negative    RISK ASSESSMENT:  Today Marshall Yoon denies SI, SIB, and HI. No overt evidence of psychosis or humberto observed. He consistently denied HI throughout his hospital stay. Patient grossly appears to be cognitively at his baseline. Patient has not exhibited any aggressive or violent behaviors during his admission. he has notable risk factors for self-harm including recent psych inpt stay, severe anxiety, substance use / pending treatment, male gender, and unstable housing.  However, risk is mitigated by no h/o suicide attempt, no plan or intent, no access to lethal means, describes a safety plan, h/o seeking help when needed and future oriented. Patient does not have access to firearms.     The patient reports that he has never had active or passive suicidal ideation and that \"I'm not a suicidal person\", though he acknowledges engaging in risky behavior that could have led to his death while he was " "intoxicated PTA. He does not remember telling police officers he was suicidal when he was brought to the hospital. We feel that he will remain at a low risk for suicide if he is able to remain sober and engaged in treatment.    Based on all available evidence he does not appear to be at imminent risk for self-harm therefore does not meet criteria for a 72-hr hold/ involuntary hospitalization.  However, based on degree of symptoms voluntary hospitalization, Substance use treatment was recommended; the patient was amenable to starting residential substance use treatment and was offered [id number] CD treatment but was adamant that he wanted to go home first (to his mother's house) rather than going to treatment directly from the hospital because he had \"some things to do\" before entering CD treatment. He did complete Rule 25 assessment prior to his discharge, and was given a copy per his request. He requested discharge on 6/15 and due to lack of imminent safety concerns, his request as granted. Patient also agreed to call 911/present to ED if any imminent safety concerns arise, including re-emergence of SI. Patient was provided with crisis resources at the time of discharge. Patient agreed to further reduce risk of self-harm by completely abstaining from illicit substances and alcohol, and agreed to remain medication adherent. Expressed understanding of the risks associated with excessive alcohol use, illicit substance use, and medication/treatment non-adherence, including increased risk of harm to self or others.            Discharge Medications:     Discharge Medication List as of 6/15/2022  1:10 PM      START taking these medications    Details   folic acid (FOLVITE) 1 MG tablet Take 1 tablet (1 mg) by mouth daily for 30 days, Disp-30 tablet, R-0, E-Prescribe      multivitamin w/minerals (THERA-VIT-M) tablet Take 1 tablet by mouth daily, Disp-30 tablet, R-0, E-Prescribe      naltrexone (DEPADE/REVIA) 50 MG tablet " "Take 1 tablet (50 mg) by mouth daily for 30 days, Disp-30 tablet, R-0, E-Prescribe      QUEtiapine (SEROQUEL) 100 MG tablet Take 1 tablet (100 mg) by mouth 3 times daily for 30 days, Disp-90 tablet, R-0, E-Prescribe      thiamine (B-1) 100 MG tablet Take 1 tablet (100 mg) by mouth daily for 30 days, Disp-30 tablet, R-0, E-Prescribe         CONTINUE these medications which have NOT CHANGED    Details   ibuprofen (ADVIL/MOTRIN) 200 MG tablet Take 400 mg by mouth every 6 hours as needed for mild pain, Historical      busPIRone (BUSPAR) 5 MG tablet Take 5 mg by mouth 2 times daily, Historical         STOP taking these medications       ALPRAZolam (XANAX) 0.5 MG tablet Comments:   Reason for Stopping:         hydrOXYzine (VISTARIL) 25 MG capsule Comments:   Reason for Stopping:         oxyCODONE (ROXICODONE) 5 MG tablet Comments:   Reason for Stopping:         traZODone (DESYREL) 50 MG tablet Comments:   Reason for Stopping:                    Psychiatric Examination:   Appearance: awake, alert, adequately groomed and dressed in hospital scrubs   Oriented to:  time, person, and place  Attitude:  cooperative, slightly guarded. Thanked writers for caring for him, and \"helping\" him  Eye Contact:  fair to poor  Mood:  \"anxious, but I feel safe\"   Affect:  mood congruent   Language: intact and fluent in English  Speech:  clear, coherent and normal prosody  Thought Process:  Mostly linear and goal oriented.  Associations:  no loose associations  Thought Content:  no evidence of suicidal ideation or homicidal ideation, no auditory hallucinations present and no visual hallucinations present. He did not appear to be responding to internal stimuli.   Psychomotor, Gait, Musculoskeletal:  no evidence of tardive dyskinesia, dystonia, or tics  Insight:  fair, adequate for safety  Judgment:  fair to good, adequate for safety  Impulse control: fair  Attention Span and Concentration:  fair  Recent and Remote Memory:  fair  Fund of " Knowledge:  Appropriate to below average         Discharge Plan:   Health Care Follow-up: Primary Care Follow-up Appointment:  [facility]:  Repeat Liver Function:      Appointment Date/Time: 6/22/22 @ 3:10pm: Primary Care Giver:  Denia Sinclair MD:  Address: [address] [place] [address] [place], [place]:   Phone Number: [phone]      A referral was made to request case management through [facility]:  Contact:  Flor @ [phone]      [facility]:  [address],  [place]:  Hours 9 am - 9 pm:  # [phone]     Addiction Medicine/Medication Management Appointment:  In person appointment:   7/13/22 @ 8:30am: Provider/Resident Doctor:  Carlene Parker  [facility]:  [address]. [place]:   [facility] 2nd Floor     Attend all scheduled appointments with your outpatient providers. Call at least 24 hours in advance if you need to reschedule an appointment to ensure continued access to your outpatient providers.        Lois Simon MD, PhD  Psychiatry Resident, PGY-2   06/16/22     Attestation:    This patient has been seen and evaluated by me, Tanika Ariza MD on the date of resident's note. I have discussed this patient with the the resident and I agree with the findings and plan in this note. I have reviewed today's vital signs, medications, labs and imaging.         Appendix A: All Labs This Admission:     Results for orders placed or performed during the hospital encounter of 06/10/22   GGT     Status: Normal   Result Value Ref Range    GGT 9 0 - 75 U/L   Lipid panel     Status: Normal   Result Value Ref Range    Cholesterol 147 <200 mg/dL    Triglycerides 129 <150 mg/dL    Direct Measure HDL 51 >=40 mg/dL    LDL Cholesterol Calculated 70 <=100 mg/dL    Non HDL Cholesterol 96 <130 mg/dL    Narrative    Cholesterol  Desirable:  <200 mg/dL    Triglycerides  Normal:  Less than 150 mg/dL  Borderline High:  150-199  mg/dL  High:  200-499 mg/dL  Very High:  Greater than or equal to 500 mg/dL    Direct Measure HDL  Female:  Greater than or equal to 50 mg/dL   Male:  Greater than or equal to 40 mg/dL    LDL Cholesterol  Desirable:  <100mg/dL  Above Desirable:  100-129 mg/dL   Borderline High:  130-159 mg/dL   High:  160-189 mg/dL   Very High:  >= 190 mg/dL    Non HDL Cholesterol  Desirable:  130 mg/dL  Above Desirable:  130-159 mg/dL  Borderline High:  160-189 mg/dL  High:  190-219 mg/dL  Very High:  Greater than or equal to 220 mg/dL   TSH with free T4 reflex and/or T3 as indicated     Status: Normal   Result Value Ref Range    TSH 1.16 0.40 - 4.00 mU/L   Vitamin B12     Status: Normal   Result Value Ref Range    Vitamin B12 590 193 - 986 pg/mL   Folate     Status: Normal   Result Value Ref Range    Folic Acid 10.9 >=5.4 ng/mL   ALT     Status: Normal   Result Value Ref Range    ALT 32 0 - 70 U/L   AST     Status: Normal   Result Value Ref Range    AST 35 0 - 45 U/L

## 2022-06-17 NOTE — H&P
"Psychiatric Discharge Summary    Marshall Yoon MRN# [id number]   Age: 31 year old YOB: 1990     Date of Admission:  6/10/2022  Date of Discharge:  6/15/2022  1:50 PM  Admitting Physician:  Romario Saunders MD  Discharge Physician:  Tanika Ariza MD         Events Leading to Hospitalization:   HPI at initial presentation per ER/A&R notes:   Patient was laying in the road with suicidal intent while intoxicated. He was brought to the ED for evaluation.  On admission, his blood alcohol level was 0.29. Patient was assessed by DEC several hours later when patient was sober. He reported having been seen in the ED at [facility] on 6/8/2022 due to panic attacks where he was prescribed Buspar for anxiety. Patient did not take the medication because he thought it was for depression instead of anxiety. On 6/9/2022, patient drank to intoxication and was found laying in the road. Patient told police that he was suicidal.      Previous diagnoses include Anxiety, Panic, Polysubstance Use Disorder and Meth Abuse. Patient reports one hospitalization at [facility] in 2017 or 2018.  He reports drinking alcohol 1-2 times per week. He denied history of DWI or ASUNCION treatment. He has no history of PHP, IOP, or commitment.  Patient has no history of psychotherapy.         Today, patient reports severe anxiety his whole life, but has become very severe in the last year.  Pt said that he has severe shaking, shortness of breath and \"Feels like my brain is getting pinched and I can't think.\"   Pt said that normally he doesn't have SI, but did on admission due to intoxication.  Pt said he sometimes can't enjoy certain things if he's anxious, but denies feeling depression.      Pt said that he just started BuSpar yesterday in the emergency department.  Pt said that he has discontinued antidepressants in the past due to bad reactions.        See Admission note by Romario Saunders MD on " 6/10/2022 for additional details.          Diagnoses:     Schizophrenia  Methamphetamine use disorder, moderate to severe  Alcohol use disorder, moderate         Consults:   Consult was placed for chemical dependency assessment. Patient was able to obtain an assessment through [facility] while inpatient         Hospital Course:   Marshall Yoon was admitted to [facility] with attending Dr. Tanika Ariza on a 72 hour mental health hold. He signed in voluntarily on 6/14/22. The patient was placed under status 15 (15 minute checks) to ensure patient safety.   MSSA protocol was initiated due to the patient's history of alcohol abuse and concern for withdrawal symptoms. After he had gone more than 48 hours without scoring above threshold on MSSA and without reporting symptoms of withdrawal, MSSA protocol was discontinued.    Psychiatric Course:   1. Medication Trials and Changes: Quetiapine 100mg at bedtime + 25mg TID PRN for anxiety/psychosis was initiated on 6/11 to target anxiety related to possible psychotic symptoms and multivitamins + thiamine + folic acid were initiated in addition to MSSA protocol with PRN diazepam for alcohol use disorder. MSSA protocol discontinued on 6/14. Also on 6/14, we increased quetiapine from 100mg at bedtime (started on admission) to 100mg TID as the patient feels that it is helpful for his anxiety and denies sedation or other side effects. Initiated naltrexone 50mg daily to target cravings. The patient had been initiated on buspirone 5mg BID during an ED visit the day before admission to our hospital (6/8/22) and we continued this during his stay.  2. Recommendations for ongoing care: If the patient continues to have significant anxiety, we would recommend increasing the dose of buspirone. He seemed to tolerate the quetiapine 100mg TID well while inpatient but if he starts experiencing sedation this may need to be reduced in dose. Due to the initiation of naltrexone, he will  "require PCP checkup with LFTs in 2 weeks. This has been scheduled for him.  3. Medication adherence: adherent  4. Change in psychiatric symptoms: Over the course of this hospitalization the patient's symptoms of anxiety improved.  5. Behaviors and group Attendance: The patient did not require chemical/physical restraints during admission. The patient was safe and appropriate. He was cooperative with cares and had good group attendance.   6. Legal Status: 72-hour hold then voluntary starting 6/14    Medical Course: Marshall Yoon was medically cleared by the ED prior to admission to the unit. PTA medications were continued. Admission labs were notable for the following:  - CMP normal  - CBC normal  - TSH normal  - Vitamin B12 normal  - UDS Positive for cocaine, otherwise not detected   - Blood alcohol level in ED on 6/9 was 0.29  - COVID negative    RISK ASSESSMENT:  Today Marshall Yoon denies SI, SIB, and HI. No overt evidence of psychosis or humberto observed. Patient grossly appears to be cognitively at his baseline. Patient has not exhibited any aggressive or violent behaviors during his admission. he has notable risk factors for self-harm including recent psych inpt stay, severe anxiety, substance use / pending treatment, male gender, and unstable housing.  However, risk is mitigated by no h/o suicide attempt, no plan or intent, no access to lethal means, describes a safety plan, h/o seeking help when needed and future oriented. Patient does not have access to firearms.     The patient reports that he has never had active or passive suicidal ideation and that \"I'm not a suicidal person\", though he acknowledges engaging in risky behavior that could have led to his death while he was drunk PTA. He does not remember telling police officers he was suicidal when he was brought to the hospital. We feel that he will remain at a low risk for suicide if he is able to remain sober and engaged in treatment.    Based " on all available evidence he does not appear to be at imminent risk for self-harm therefore does not meet criteria for a 72-hr hold/ involuntary hospitalization.  However, based on degree of symptoms voluntary hospitalization, crisis housing and substance use treatment was recommended; the patient was amenable to starting residential substance use treatment but was adamant that he wanted to go home first (to his mother's house) rather than going to treatment directly from the hospital. Patient also agreed to call 911/present to ED if any imminent safety concerns arise, including re-emergence of SI. Patient was provided with crisis resources at the time of discharge. Patient agreed to further reduce risk of self-harm by completely abstaining from illicit substances and alcohol, and agreed to remain medication adherent. Expressed understanding of the risks associated with excessive alcohol use, illicit substance use, and medication/treatment non-adherence, including increased risk of harm to self or others.            Discharge Medications:     Discharge Medication List as of 6/15/2022  1:10 PM      START taking these medications    Details   folic acid (FOLVITE) 1 MG tablet Take 1 tablet (1 mg) by mouth daily for 30 days, Disp-30 tablet, R-0, E-Prescribe      multivitamin w/minerals (THERA-VIT-M) tablet Take 1 tablet by mouth daily, Disp-30 tablet, R-0, E-Prescribe      naltrexone (DEPADE/REVIA) 50 MG tablet Take 1 tablet (50 mg) by mouth daily for 30 days, Disp-30 tablet, R-0, E-Prescribe      QUEtiapine (SEROQUEL) 100 MG tablet Take 1 tablet (100 mg) by mouth 3 times daily for 30 days, Disp-90 tablet, R-0, E-Prescribe      thiamine (B-1) 100 MG tablet Take 1 tablet (100 mg) by mouth daily for 30 days, Disp-30 tablet, R-0, E-Prescribe         CONTINUE these medications which have NOT CHANGED    Details   ibuprofen (ADVIL/MOTRIN) 200 MG tablet Take 400 mg by mouth every 6 hours as needed for mild pain, Historical       busPIRone (BUSPAR) 5 MG tablet Take 5 mg by mouth 2 times daily, Historical         STOP taking these medications       ALPRAZolam (XANAX) 0.5 MG tablet Comments:   Reason for Stopping:         hydrOXYzine (VISTARIL) 25 MG capsule Comments:   Reason for Stopping:         oxyCODONE (ROXICODONE) 5 MG tablet Comments:   Reason for Stopping:         traZODone (DESYREL) 50 MG tablet Comments:   Reason for Stopping:                    Psychiatric Examination:   Appearance: awake, alert, adequately groomed and dressed in hospital scrubs   Oriented to:  time, person, and place  Attitude:  cooperative  Eye Contact:  fair to poor  Mood:  anxious  Affect:  mood congruent   Language: intact and fluent in English  Speech:  clear, coherent and normal prosody  Thought Process:  Mostly linear and goal oriented. Some difficulty comprehending logic surrounding discharge planning  Associations:  no loose associations  Thought Content:  no evidence of suicidal ideation or homicidal ideation, no auditory hallucinations present and no visual hallucinations present  Psychomotor, Gait, Musculoskeletal:  no evidence of tardive dyskinesia, dystonia, or tics  Insight:  fair  Judgment:  fair to good  Impulse control: fair  Attention Span and Concentration:  fair  Recent and Remote Memory:  fair  Fund of Knowledge:  Appropriate to below average         Discharge Plan:   Health Care Follow-up: Primary Care Follow-up Appointment:  [facility]:  Repeat Liver Function:      Appointment Date/Time: 6/22/22 @ 3:10pm: Primary Care Giver:  Denia Sinclair MD:  Address: [address] [place] [address] [place]:   Phone Number: [phone]      A referral was made to request case management through [facility]:  Contact:  Flor @ [phone]      [facility]:  [address],  [place]:  Hours 9 am - 9 pm:  # [phone]     Addiction Medicine/Medication Management Appointment:  In person  appointment:   7/13/22 @ 8:30am: Provider/Resident Doctor:  Carlene Parker  [facility]:  [facility]. [place]:   [facility] 2nd Floor     Attend all scheduled appointments with your outpatient providers. Call at least 24 hours in advance if you need to reschedule an appointment to ensure continued access to your outpatient providers.        Lois Simon MD, PhD  Psychiatry Resident, PGY-2   06/16/22     Attestation:          Appendix A: All Labs This Admission:     Results for orders placed or performed during the hospital encounter of 06/10/22   GGT     Status: Normal   Result Value Ref Range    GGT 9 0 - 75 U/L   Lipid panel     Status: Normal   Result Value Ref Range    Cholesterol 147 <200 mg/dL    Triglycerides 129 <150 mg/dL    Direct Measure HDL 51 >=40 mg/dL    LDL Cholesterol Calculated 70 <=100 mg/dL    Non HDL Cholesterol 96 <130 mg/dL    Narrative    Cholesterol  Desirable:  <200 mg/dL    Triglycerides  Normal:  Less than 150 mg/dL  Borderline High:  150-199 mg/dL  High:  200-499 mg/dL  Very High:  Greater than or equal to 500 mg/dL    Direct Measure HDL  Female:  Greater than or equal to 50 mg/dL   Male:  Greater than or equal to 40 mg/dL    LDL Cholesterol  Desirable:  <100mg/dL  Above Desirable:  100-129 mg/dL   Borderline High:  130-159 mg/dL   High:  160-189 mg/dL   Very High:  >= 190 mg/dL    Non HDL Cholesterol  Desirable:  130 mg/dL  Above Desirable:  130-159 mg/dL  Borderline High:  160-189 mg/dL  High:  190-219 mg/dL  Very High:  Greater than or equal to 220 mg/dL   TSH with free T4 reflex and/or T3 as indicated     Status: Normal   Result Value Ref Range    TSH 1.16 0.40 - 4.00 mU/L   Vitamin B12     Status: Normal   Result Value Ref Range    Vitamin B12 590 193 - 986 pg/mL   Folate     Status: Normal   Result Value Ref Range    Folic Acid 10.9 >=5.4 ng/mL   ALT     Status: Normal   Result Value Ref Range    ALT 32 0 - 70 U/L   AST     Status: Normal    Result Value Ref Range    AST 35 0 - 45 U/L

## 2022-06-17 NOTE — PROGRESS NOTES
Clinic Care Coordination Contact  [facility]/Voicemail       Clinical Data: Care Coordinator Outreach  Outreach attempted x 2.  Unable to leave  message on patient's voicemail with call back information.    Plan: Care Coordinator will do no further outreaches at this time.    TAMMY Owens  Connected Care Resource Center  [facility]     *Connected Care Resource Team does NOT follow patient ongoing. Referrals are identified based on internal discharge reports and the outreach is to ensure patient has an understanding of their discharge instructions.

## 2022-09-18 ENCOUNTER — HEALTH MAINTENANCE LETTER (OUTPATIENT)
Age: 32
End: 2022-09-18

## 2022-11-17 ENCOUNTER — APPOINTMENT (OUTPATIENT)
Dept: GENERAL RADIOLOGY | Facility: CLINIC | Age: 32
End: 2022-11-17
Attending: EMERGENCY MEDICINE
Payer: COMMERCIAL

## 2022-11-17 ENCOUNTER — HOSPITAL ENCOUNTER (EMERGENCY)
Facility: CLINIC | Age: 32
Discharge: HOME | End: 2022-11-18
Attending: EMERGENCY MEDICINE | Admitting: EMERGENCY MEDICINE
Payer: COMMERCIAL

## 2022-11-17 DIAGNOSIS — F41.1 GENERALIZED ANXIETY DISORDER WITH PANIC ATTACKS: ICD-10-CM

## 2022-11-17 DIAGNOSIS — F41.0 GENERALIZED ANXIETY DISORDER WITH PANIC ATTACKS: ICD-10-CM

## 2022-11-17 DIAGNOSIS — F10.929 ALCOHOLIC INTOXICATION WITH COMPLICATION (H): ICD-10-CM

## 2022-11-17 LAB
ALBUMIN SERPL-MCNC: 3.9 G/DL (ref 3.4–5)
ALCOHOL BREATH TEST: 0.29 (ref 0–0.01)
ALP SERPL-CCNC: 41 U/L (ref 40–150)
ALT SERPL W P-5'-P-CCNC: 23 U/L (ref 0–70)
ANION GAP SERPL CALCULATED.3IONS-SCNC: 6 MMOL/L (ref 3–14)
AST SERPL W P-5'-P-CCNC: 20 U/L (ref 0–45)
ATRIAL RATE - MUSE: 68 BPM
BASOPHILS # BLD AUTO: 0.1 10E3/UL (ref 0–0.2)
BASOPHILS NFR BLD AUTO: 1 %
BILIRUB SERPL-MCNC: 0.3 MG/DL (ref 0.2–1.3)
BUN SERPL-MCNC: 18 MG/DL (ref 7–30)
CALCIUM SERPL-MCNC: 8.7 MG/DL (ref 8.5–10.1)
CHLORIDE BLD-SCNC: 105 MMOL/L (ref 94–109)
CO2 SERPL-SCNC: 28 MMOL/L (ref 20–32)
CREAT SERPL-MCNC: 0.92 MG/DL (ref 0.66–1.25)
DIASTOLIC BLOOD PRESSURE - MUSE: NORMAL MMHG
EOSINOPHIL # BLD AUTO: 0.1 10E3/UL (ref 0–0.7)
EOSINOPHIL NFR BLD AUTO: 2 %
ERYTHROCYTE [DISTWIDTH] IN BLOOD BY AUTOMATED COUNT: 13.5 % (ref 10–15)
ETHANOL SERPL-MCNC: 0.45 G/DL
GFR SERPL CREATININE-BSD FRML MDRD: >90 ML/MIN/1.73M2
GLUCOSE BLD-MCNC: 99 MG/DL (ref 70–99)
GLUCOSE BLDC GLUCOMTR-MCNC: 117 MG/DL (ref 70–99)
HCT VFR BLD AUTO: 43.4 % (ref 40–53)
HGB BLD-MCNC: 14.8 G/DL (ref 13.3–17.7)
HOLD SPECIMEN: NORMAL
HOLD SPECIMEN: NORMAL
IMM GRANULOCYTES # BLD: 0 10E3/UL
IMM GRANULOCYTES NFR BLD: 0 %
INTERPRETATION ECG - MUSE: NORMAL
LIPASE SERPL-CCNC: 148 U/L (ref 73–393)
LYMPHOCYTES # BLD AUTO: 2.1 10E3/UL (ref 0.8–5.3)
LYMPHOCYTES NFR BLD AUTO: 36 %
MCH RBC QN AUTO: 31.5 PG (ref 26.5–33)
MCHC RBC AUTO-ENTMCNC: 34.1 G/DL (ref 31.5–36.5)
MCV RBC AUTO: 92 FL (ref 78–100)
MONOCYTES # BLD AUTO: 0.5 10E3/UL (ref 0–1.3)
MONOCYTES NFR BLD AUTO: 8 %
NEUTROPHILS # BLD AUTO: 3.1 10E3/UL (ref 1.6–8.3)
NEUTROPHILS NFR BLD AUTO: 53 %
NRBC # BLD AUTO: 0 10E3/UL
NRBC BLD AUTO-RTO: 0 /100
P AXIS - MUSE: 49 DEGREES
PLATELET # BLD AUTO: 270 10E3/UL (ref 150–450)
POTASSIUM BLD-SCNC: 4.1 MMOL/L (ref 3.4–5.3)
PR INTERVAL - MUSE: 170 MS
PROT SERPL-MCNC: 7.4 G/DL (ref 6.8–8.8)
QRS DURATION - MUSE: 92 MS
QT - MUSE: 366 MS
QTC - MUSE: 389 MS
R AXIS - MUSE: 11 DEGREES
RBC # BLD AUTO: 4.7 10E6/UL (ref 4.4–5.9)
SARS-COV-2 RNA RESP QL NAA+PROBE: NEGATIVE
SODIUM SERPL-SCNC: 139 MMOL/L (ref 133–144)
SYSTOLIC BLOOD PRESSURE - MUSE: NORMAL MMHG
T AXIS - MUSE: 25 DEGREES
TROPONIN I SERPL HS-MCNC: 7 NG/L
VENTRICULAR RATE- MUSE: 68 BPM
WBC # BLD AUTO: 5.9 10E3/UL (ref 4–11)

## 2022-11-17 PROCEDURE — 36415 COLL VENOUS BLD VENIPUNCTURE: CPT | Performed by: EMERGENCY MEDICINE

## 2022-11-17 PROCEDURE — 99285 EMERGENCY DEPT VISIT HI MDM: CPT | Mod: 25

## 2022-11-17 PROCEDURE — 82077 ASSAY SPEC XCP UR&BREATH IA: CPT | Performed by: EMERGENCY MEDICINE

## 2022-11-17 PROCEDURE — C9803 HOPD COVID-19 SPEC COLLECT: HCPCS

## 2022-11-17 PROCEDURE — 93005 ELECTROCARDIOGRAM TRACING: CPT

## 2022-11-17 PROCEDURE — 80053 COMPREHEN METABOLIC PANEL: CPT | Performed by: EMERGENCY MEDICINE

## 2022-11-17 PROCEDURE — 85025 COMPLETE CBC W/AUTO DIFF WBC: CPT | Performed by: EMERGENCY MEDICINE

## 2022-11-17 PROCEDURE — 71046 X-RAY EXAM CHEST 2 VIEWS: CPT

## 2022-11-17 PROCEDURE — 84484 ASSAY OF TROPONIN QUANT: CPT | Performed by: EMERGENCY MEDICINE

## 2022-11-17 PROCEDURE — 83690 ASSAY OF LIPASE: CPT | Performed by: EMERGENCY MEDICINE

## 2022-11-17 PROCEDURE — U0005 INFEC AGEN DETEC AMPLI PROBE: HCPCS | Performed by: EMERGENCY MEDICINE

## 2022-11-17 PROCEDURE — 82075 ASSAY OF BREATH ETHANOL: CPT

## 2022-11-17 ASSESSMENT — ACTIVITIES OF DAILY LIVING (ADL)
ADLS_ACUITY_SCORE: 35
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
## 2022-11-18 VITALS
HEIGHT: 72 IN | OXYGEN SATURATION: 98 % | SYSTOLIC BLOOD PRESSURE: 138 MMHG | RESPIRATION RATE: 18 BRPM | WEIGHT: 202.8 LBS | HEART RATE: 68 BPM | DIASTOLIC BLOOD PRESSURE: 92 MMHG | BODY MASS INDEX: 27.47 KG/M2 | TEMPERATURE: 99 F

## 2022-11-18 LAB
ALBUMIN UR-MCNC: NEGATIVE MG/DL
APPEARANCE UR: CLEAR
BILIRUB UR QL STRIP: NEGATIVE
C TRACH DNA SPEC QL NAA+PROBE: NEGATIVE
COLOR UR AUTO: NORMAL
GLUCOSE UR STRIP-MCNC: NEGATIVE MG/DL
HGB UR QL STRIP: NEGATIVE
KETONES UR STRIP-MCNC: NEGATIVE MG/DL
LEUKOCYTE ESTERASE UR QL STRIP: NEGATIVE
N GONORRHOEA DNA SPEC QL NAA+PROBE: NEGATIVE
NITRATE UR QL: NEGATIVE
PH UR STRIP: 5.5 [PH] (ref 5–7)
RBC URINE: 1 /HPF
SP GR UR STRIP: 1.03 (ref 1–1.03)
UROBILINOGEN UR STRIP-MCNC: NORMAL MG/DL
WBC URINE: <1 /HPF

## 2022-11-18 PROCEDURE — 250N000013 HC RX MED GY IP 250 OP 250 PS 637: Performed by: PSYCHIATRY & NEUROLOGY

## 2022-11-18 PROCEDURE — 87591 N.GONORRHOEAE DNA AMP PROB: CPT | Performed by: EMERGENCY MEDICINE

## 2022-11-18 PROCEDURE — 250N000011 HC RX IP 250 OP 636: Performed by: EMERGENCY MEDICINE

## 2022-11-18 PROCEDURE — 99204 OFFICE O/P NEW MOD 45 MIN: CPT | Performed by: PSYCHIATRY & NEUROLOGY

## 2022-11-18 PROCEDURE — 90791 PSYCH DIAGNOSTIC EVALUATION: CPT

## 2022-11-18 PROCEDURE — 96374 THER/PROPH/DIAG INJ IV PUSH: CPT

## 2022-11-18 PROCEDURE — 87491 CHLMYD TRACH DNA AMP PROBE: CPT | Performed by: EMERGENCY MEDICINE

## 2022-11-18 PROCEDURE — 81001 URINALYSIS AUTO W/SCOPE: CPT | Performed by: EMERGENCY MEDICINE

## 2022-11-18 RX ORDER — DIVALPROEX SODIUM 500 MG/1
500 TABLET, DELAYED RELEASE ORAL 3 TIMES DAILY
Qty: 90 TABLET | Refills: 0 | Status: SHIPPED | OUTPATIENT
Start: 2022-11-18

## 2022-11-18 RX ORDER — DIVALPROEX SODIUM 500 MG/1
500 TABLET, DELAYED RELEASE ORAL 3 TIMES DAILY
Status: DISCONTINUED | OUTPATIENT
Start: 2022-11-18 | End: 2022-11-18 | Stop reason: HOSPADM

## 2022-11-18 RX ORDER — KETOROLAC TROMETHAMINE 15 MG/ML
15 INJECTION, SOLUTION INTRAMUSCULAR; INTRAVENOUS ONCE
Status: COMPLETED | OUTPATIENT
Start: 2022-11-18 | End: 2022-11-18

## 2022-11-18 RX ORDER — CLONIDINE HYDROCHLORIDE 0.1 MG/1
0.1 TABLET ORAL DAILY PRN
Qty: 20 TABLET | Refills: 0 | Status: SHIPPED | OUTPATIENT
Start: 2022-11-18

## 2022-11-18 RX ORDER — DIAZEPAM 10 MG/2ML
5-10 INJECTION, SOLUTION INTRAMUSCULAR; INTRAVENOUS EVERY 30 MIN PRN
Status: DISCONTINUED | OUTPATIENT
Start: 2022-11-18 | End: 2022-11-18 | Stop reason: HOSPADM

## 2022-11-18 RX ORDER — DIAZEPAM 5 MG
10 TABLET ORAL EVERY 30 MIN PRN
Status: DISCONTINUED | OUTPATIENT
Start: 2022-11-18 | End: 2022-11-18 | Stop reason: HOSPADM

## 2022-11-18 RX ORDER — HYDROXYZINE HYDROCHLORIDE 50 MG/1
50 TABLET, FILM COATED ORAL 3 TIMES DAILY PRN
Status: DISCONTINUED | OUTPATIENT
Start: 2022-11-18 | End: 2022-11-18 | Stop reason: HOSPADM

## 2022-11-18 RX ORDER — MULTIPLE VITAMINS W/ MINERALS TAB 9MG-400MCG
1 TAB ORAL DAILY
Status: DISCONTINUED | OUTPATIENT
Start: 2022-11-18 | End: 2022-11-18 | Stop reason: HOSPADM

## 2022-11-18 RX ADMIN — HYDROXYZINE HYDROCHLORIDE 50 MG: 50 TABLET, FILM COATED ORAL at 11:02

## 2022-11-18 RX ADMIN — HYDROXYZINE HYDROCHLORIDE 50 MG: 50 TABLET, FILM COATED ORAL at 17:48

## 2022-11-18 RX ADMIN — KETOROLAC TROMETHAMINE 15 MG: 15 INJECTION, SOLUTION INTRAMUSCULAR; INTRAVENOUS at 05:39

## 2022-11-18 RX ADMIN — DIVALPROEX SODIUM 500 MG: 500 TABLET, DELAYED RELEASE ORAL at 16:49

## 2022-11-18 RX ADMIN — MULTIPLE VITAMINS W/ MINERALS TAB 1 TABLET: TAB at 16:48

## 2022-11-18 ASSESSMENT — COLUMBIA-SUICIDE SEVERITY RATING SCALE - C-SSRS
6. HAVE YOU EVER DONE ANYTHING, STARTED TO DO ANYTHING, OR PREPARED TO DO ANYTHING TO END YOUR LIFE?: NO
TOTAL  NUMBER OF ABORTED OR SELF INTERRUPTED ATTEMPTS LIFETIME: NO
ATTEMPT LIFETIME: NO
1. HAVE YOU WISHED YOU WERE DEAD OR WISHED YOU COULD GO TO SLEEP AND NOT WAKE UP?: NO
2. HAVE YOU ACTUALLY HAD ANY THOUGHTS OF KILLING YOURSELF?: NO
TOTAL  NUMBER OF INTERRUPTED ATTEMPTS LIFETIME: NO

## 2022-11-18 ASSESSMENT — ACTIVITIES OF DAILY LIVING (ADL)
ADLS_ACUITY_SCORE: 35

## 2022-11-18 NOTE — DISCHARGE INSTRUCTIONS
ate: Monday, 11/28/2022  Time: 2:00 pm - 3:00 pm  Provider: Marzena Adkins MD, MD  Location: [facility]  Phone: [phone]  Type: Telepsychiatry    Please fill New Patient Form by using following link. All forms need to be completed 48 hours prior to the appointment date/time by going to [email or web address] Please call us on [phone] 72 hours prior to your scheduled appointment to confirm that you are able to attend. We will provide you information about how to log into video call software when you call.    Aftercare Plan    Follow up with established providers and supports as scheduled. Continue taking medications as prescribed. Abstain from drugs and alcohol. Utilize your [facility] mental health crisis team as needed. They are available 24/7. Contact information is listed below.     If I am feeling unsafe or I am in a crisis, I will:   Contact my established care providers   Call the [place] Suicide Prevention Lifeline: [phone]   Go to the nearest emergency room   Call [phone]     Warning signs that I or other people might notice when a crisis is developing for me: changes to sleep, appetite or mood, increased anger, agitation or irritability, feeling depressed or hopeless, spending more time alone or talking less, increased crying, decreased productivity, seeing or hearing things that aren't there, thoughts of not wanting to live anymore or of actually killing myself, thoughts of hurting others    Things I am able to do on my own to cope or help me feel better: watching a favorite tv show or movie, listening to music I enjoy, going outside and breathing fresh air, going for a walk or exercising, taking a shower or bath, a cold or hot beverage, a healthy snack, drawing/coloring/painting, journaling, singing or dancing, deep breathing     I can try practicing square breathing when I begin to feel anxious - inhale through the nose for the count of 4 and the first line on the square.  Exhale through the mouth for the count of 4 for the second line of the square. Repeat to complete the square. Repeat the square as many times as needed.    I can also use my five senses to practice mindfulness and grounding. What are five things I can see, four things I can hear, three things I can feel, two things I can smell, and one thing I can taste.     Things that I am able to do with others to cope or help me feel better: sometimes just talking or spending time with someone else, sharing a meal or having coffee, watching a movie or playing a game, going for a walk or exercising    I can also use community resources including mental health hotlines, Critical access hospital crisis teams, or apps.     Things I can use or do for distraction: movies/tv, music, reading, games, drawing/coloring/painting or other art, essential oils, exercise, cleaning/organizing, puzzles, crossword puzzles, word search, Sudoku       I can also download a meditation or relaxation chavez, like Calm, Headspace, or Insight Timer (all three offer a free version)    A great website resource is Change to Chill with active coping skills    Changes I can make to support my mental health and wellness: Attend scheduled mental health therapy and psychiatric appointments. Take my medications as prescribed. Maintain a daily schedule/routine. Abstain from all mood altering substances, including drugs, alcohol, or medications not currently prescribed to me. Implement a self-care routine.      People in my life that I can ask for help: friends or family, trusted teachers/staff/colleagues, trusted members of my community or place of Religious, mental health crisis lines, or 911    Your Critical access hospital has a mental health crisis team you can call 24/7: [facility], [phone]    Other things that are important when I m in crisis: to remember that the feelings I am having right now are temporary, and it won't feel like this forever, and that it is okay and important to  "ask for help    Crisis Lines  Crisis Text Line  Text 083718  You will be connected with a trained live crisis counselor to provide support.    Por june, marvelo  NICOLE a 264726 o texto a 442-AYUDAME en WhatsApp    National Hope Line  1.800.SUICIDE [0882901]      Community Resources  Fast Tracker  Linking people to mental health and substance use disorder resources  fasttrackPiAuton.org     [place] [facility] Warm Line  Peer to peer support  Monday thru Saturday, 12 pm to 10 pm  987.276.6278 or 4.410.061.9492  Text \"Support\" to 20417    National [place] [employer] Mental Illness (TITA)  044.614.4272 or 1.888.TITA.HELPS      Mental Health Apps  My3  https://[employer]oret[employer] Technology.org/    VirtualHopeBox  https://CrownBio/apps/virtual-hope-box/      Additional Information  Today you were seen by a licensed mental health professional through Triage and Transition services, [facility] ([facility])  for a crisis assessment in the Emergency Department at [facility].  It is recommended that you follow up with your established providers (psychiatrist, mental health therapist, and/or primary care doctor - as relevant) as soon as possible. Coordinators from [facility] will be calling you in the next 24-48 hours to ensure that you have the resources you need.  You can also contact [facility] coordinators directly at [phone]. You may have been scheduled for or offered an appointment with a mental health provider. [facility] maintains an extensive network of licensed behavioral health providers to connect patients with the services they need.  We do not charge providers a fee to participate in our referral network.  We match patients with providers based on a patient's specific needs, insurance coverage, and location.  Our first effort will be to refer you to a provider within your care system, and will utilize providers outside your care system as needed.        "

## 2022-11-18 NOTE — ED PROVIDER NOTES
Marshall Yoon is a 32-year-old male who presented to the emergency department with psychiatric evaluation.  Patient was signed out to me pending reevaluation once clinically sober.  On reevaluation, patient was clinically sober but seems to be perseverating about multiple different medical conditions and concerns.  He notes severe anxiety and panic attacks.  He was agreeable to going to [facility].  He does note some abnormal discharge from his penis recently so gonorrhea and chlamydia was obtained.  These are in process.  He denies being sexually active or known exposure to STDs.  Given this, will not presumptively treat.  His urinalysis is clear.  Patient will be moved to [facility].     Rosey Newton MD  11/18/22 [id number]

## 2022-11-18 NOTE — ED PROVIDER NOTES
EmPATH Unit - Psychiatry  Combined Observation Note and Discharge Summary  [facility] Emergency Department  Observation Initiation Date: Nov 17, 2022    Marshall Yoon MRN: [id number]   Age: 32 year old YOB: 1990     History     Chief Complaint   Patient presents with     Psychiatric Evaluation     HPI  Marshall Yoon is a 32 year old male with a past history notable for anxiety further complicated by substance use disorders with more prominent usage of alcohol.  He presented to the emergency department with various somatic concerns.  His blood alcohol level was 0.45.  He was monitored and determined to be medically stable, after which time he was transferred to the EmPATH unit for psychiatric assessment.  On examination, the patient reports that he consumes excessive amounts of alcohol twice a week with intent to subdue him symptoms of anxiety and panic.  He denied significant tolerance or withdrawal from alcohol.  He has tried various medications in the past aimed at treating his anxiety, naming hydroxyzine, BuSpar, Seroquel, gabapentin, and likely others however has not gained benefit.  On record review, it was mention of schizophrenia however the patient contests this diagnosis.  He denied hallucinations or feelings of paranoia.  He had some concern regarding physical symptoms, i.e. occasional pain in his abdomen, and was seeking reassurance from the recent blood work that was completed in the emergency department.  He outlined a plan to follow up with a primary care physician for a more extensive evaluation of these concerns.  He denied illicit substance usage.  He denied suicidal and homicidal thoughts.  His primary goal is to discuss medications aimed at treating anxiety, then discharge home.      Past Medical History  Past Medical History:   Diagnosis Date     Anxiety      Past Surgical History:   Procedure Laterality Date     EXTRACTION(S) DENTAL N/A 1/13/2022    Procedure: EXTRACTION,  TOOTH 17 and 18 and 32 with incision and drainage, extraoral left;  Surgeon: Saleem Horn DDS;  Location: [facility] OR     cloNIDine (CATAPRES) 0.1 MG tablet  divalproex sodium delayed-release (DEPAKOTE) 500 MG DR tablet  multivitamin w/minerals (THERA-VIT-M) tablet      No Known Allergies  Family History  No family history on file.  Social History   Social History     Tobacco Use     Smoking status: Every Day     Packs/day: 0.25     Types: Cigarettes     Smokeless tobacco: Never   Substance Use Topics     Alcohol use: No      Past medical history, past surgical history, medications, allergies, family history, and social history were reviewed with the patient. No additional pertinent items.       Review of Systems  A complete review of systems was performed with pertinent positives and negatives noted in the HPI, and all other systems negative.    Physical Examination   BP: (!) 146/83  Pulse: 79  Temp: 97.8  F (36.6  C)  Resp: 16  Height: 182.9 cm (6')  Weight: 93.9 kg (207 lb)  SpO2: 96 %    Physical Exam  General: Appears stated age.   Neuro: Alert and fully oriented. Extremities appear to demonstrate normal strength on visual inspection.   Integumentary/Skin: no rash visualized, normal color    Psychiatric Examination   Appearance: awake, alert  Attitude:  cooperative  Eye Contact:  fair  Mood:  anxious  Affect:  intensity is blunted  Speech:  clear, coherent  Psychomotor Behavior:  no evidence of tardive dyskinesia, dystonia, or tics  Thought Process:  logical and linear  Associations:  no loose associations  Thought Content:  no evidence of suicidal ideation or homicidal ideation and no evidence of psychotic thought  Insight:  fair  Judgement:  intact  Oriented to:  time, person, and place  Attention Span and Concentration:  fair  Recent and Remote Memory:  fair  Language: able to name/identify objects without impairment  Fund of Knowledge: intact with awareness of current and past events    ED Course        Labs  Ordered and Resulted from Time of ED Arrival to Time of ED Departure   ETHYL ALCOHOL LEVEL - Abnormal       Result Value    Alcohol ethyl 0.45 (*)    GLUCOSE BY METER - Abnormal    GLUCOSE BY METER POCT 117 (*)    ALCOHOL BREATH TEST POCT - Abnormal    Alcohol Breath Test 0.286 (*)    COMPREHENSIVE METABOLIC PANEL - Normal    Sodium 139      Potassium 4.1      Chloride 105      Carbon Dioxide (CO2) 28      Anion Gap 6      Urea Nitrogen 18      Creatinine 0.92      Calcium 8.7      Glucose 99      Alkaline Phosphatase 41      AST 20      ALT 23      Protein Total 7.4      Albumin 3.9      Bilirubin Total 0.3      GFR Estimate >90     TROPONIN I - Normal    Troponin I High Sensitivity 7     LIPASE - Normal    Lipase 148     COVID-19 VIRUS (CORONAVIRUS) BY PCR - Normal    SARS CoV2 PCR Negative     ROUTINE UA WITH MICROSCOPIC REFLEX TO CULTURE - Normal    Color Urine Light Yellow      Appearance Urine Clear      Glucose Urine Negative      Bilirubin Urine Negative      Ketones Urine Negative      Specific Gravity Urine 1.026      Blood Urine Negative      pH Urine 5.5      Protein Albumin Urine Negative      Urobilinogen Urine Normal      Nitrite Urine Negative      Leukocyte Esterase Urine Negative      RBC Urine 1      WBC Urine <1     CHLAMYDIA TRACHOMATIS PCR - Normal    Chlamydia trachomatis Negative     NEISSERIA GONORRHOEAE PCR - Normal    Neisseria gonorrhoeae Negative     CBC WITH PLATELETS AND DIFFERENTIAL    WBC Count 5.9      RBC Count 4.70      Hemoglobin 14.8      Hematocrit 43.4      MCV 92      MCH 31.5      MCHC 34.1      RDW 13.5      Platelet Count 270      % Neutrophils 53      % Lymphocytes 36      % Monocytes 8      % Eosinophils 2      % Basophils 1      % Immature Granulocytes 0      NRBCs per 100 WBC 0      Absolute Neutrophils 3.1      Absolute Lymphocytes 2.1      Absolute Monocytes 0.5      Absolute Eosinophils 0.1      Absolute Basophils 0.1      Absolute Immature Granulocytes 0.0       Absolute NRBCs 0.0         Assessments & Plan (with Medical Decision Making)   Patient presenting with worsening symptoms of anxiety and panic leading to concern for medical stability.  Work-up in the emergency department was unremarkable.  He was open to utilizing medication management aimed at reducing anxiety.. Nursing notes reviewed noting no acute issues.     I have reviewed the assessment completed by the [facility].     During the observation period, the patient did not require medications for agitation, and did not require restraints/seclusion for patient and/or provider safety.    The patient was found to have a psychiatric condition that would benefit from an observation stay in the emergency department for further psychiatric stabilization and/or coordination of a safe disposition. The observation plan includes serial assessments of psychiatric condition, potential administration of medications if indicated, further disposition pending the patient's psychiatric course during the monitoring period.     Preliminary diagnosis:    ICD-10-CM    1. Alcoholic intoxication with complication (H)  F10.929       2. Generalized anxiety disorder with panic attacks  F41.1     F41.0            Treatment Plan:  -Noting that the patient has tried many first and second line treatments of anxiety, we discussed a trial of Depakote to be used off label in treating anxiety.  Depakote will be initiated at a dose of 500 mg 3 times a day.  After establishing a therapeutic response, he may consider combining the dose to twice a day to ease compliance.  Risks and benefits were reviewed.  -Labs were reviewed noting normal LFT prior to initiating Depakote.  -The patient is not seeking substance use disorder treatments today and foresees the ability to abstain from alcohol.  -Referral for outpatient medication management  -Discharge home today.    After the period in observation care, the patient's circumstances and mental state were safe  for outpatient management. After counseling on the diagnosis, work-up, and treatment plan, the patient was discharged. Close follow-up with a psychiatrist and/or therapist was recommended and community psychiatric resources were provided. Patient is to return to the ED if any urgent or potentially life-threatening concerns.      At the time of discharge, the patient's acute suicide risk was determined to be low due to the following factors: Reduction in the intensity of mood/anxiety symptoms that preceded the admission, denial of suicidal thoughts, denies feeling helpless or helpless, not currently under the influence of alcohol or illicit substances, denies experiencing command hallucinations, no immediate access to firearms. The patient's acute risk could be higher if noncompliant with their treatment plan, medications, follow-up appointments or using illicit substances or alcohol. Protective factors include: social supports    --  Art Metcalf MD   [facility] EMERGENCY DEPT  EmPATH Unit  11/17/2022         Art Metcalf MD  11/18/22 1500

## 2022-11-18 NOTE — ED NOTES
Patient requesting food. Patient falls back to sleep when RN is in with patient. Patient informed that he will receive something to eat when he is more alert due to concerns of his inability to stay awake while this RN is talking to him.

## 2022-11-18 NOTE — CONSULTS
"Diagnostic Evaluation Consultation  Crisis Assessment    Patient was assessed: In Person  Patient location: [facility]  Was a release of information signed: No. Reason: n/a      Referral Data and Chief Complaint  Herve is a 32 year old, who uses he/him pronouns, and presents to the ED via EMS. Patient is referred to the ED by self. Patient is presenting to the ED for the following concerns: anxiety, ETOH.      Informed Consent and Assessment Methods     Patient is his own guardian. Writer met with patient and explained the crisis assessment process, including applicable information disclosures and limits to confidentiality, assessed understanding of the process, and obtained consent to proceed with the assessment. Patient was observed to be able to participate in the assessment as evidenced by verbal understanding of the assessment process. Assessment methods included conducting a formal interview with patient, review of medical records, collaboration with medical staff, and obtaining relevant collateral information from family and community providers when available..     Over the course of this crisis assessment provided reassurance, offered validation and engaged patient in problem solving and disposition planning. Patient's response to interventions was engaged     Summary of Patient Situation  PT presented to the ED via EMS for concerns of ETOH and \"being posioned with lead\".  Pt was subsequently found to be intoxicated to a .45.  Pt upon sober reported concerns of anxiety and panic which led to his transfer to [facility].    Brief Psychosocial History  Pt reports that he currently lives with his mom.  PT is unemployed and reports that he has been such for 6 years.  PT reports that at times he gets [facility] support.    Significant Clinical History  Pt reports a hx of anxiety and panic disorder. Chart review indicates a concern of substance abuse for ETOH as well as Meth.  Pt currently endorses a long hx of anxiety and " panic sx.  PT reports that he had 2 panic attacks yesterday which led to his increased ETOH use to help cope.  Pt reports that his anxiety has been increasing as well as his panic attacks.  Pt denies any sx of depression, psychosis, humberto, delusional thinking or paranoia.  PT presents as liner and organized with no overt sx of psychosis or disorganization.  There was noted MD concerns of possible somatic sx, however PT did not discuss that with Writer.    Pt denies any safety concerns including suicidal ideation, intent or planning.  Pt denies any self harm or homicidal ideation.  PT denies any previous attempts.  PT denies access to firearms.    Pt denies any current MH providers.  PT denies any current medications.  Pt reports that he stopped taking any medications a year ago as he felt they weren't helpful.  Pt has been previously hospitalized as recently as June, however this appears to be in the context of likely substance induced psychosis.      Pt reports drinking 2 times a week around 7-10 beers.  PT denies any drug use.  Pt denies any withdrawal concerns.  PT denies the desire for resources at time.  Pt reports that he has completed one previous outpatient treatment.     Collateral Information  PAO QUESADA (Mother) -- -- [phone]     Writer attempted to call collateral.  There was no answer and no ability to leave a VM, the phone only con't to ring.     Risk Assessment  [place] Suicide Severity Rating Scale Full Clinical Version: 11/18  Suicidal Ideation  1. Wish to be Dead (Lifetime): No  2. Non-Specific Active Suicidal Thoughts (Lifetime): No     Suicidal Behavior  Actual Attempt (Lifetime): No  Has subject engaged in non-suicidal self-injurious behavior? (Lifetime): No  Interrupted Attempts (Lifetime): No  Aborted or Self-Interrupted Attempt (Lifetime): No  Preparatory Acts or Behavior (Lifetime): No  C-SSRS Risk (Lifetime/Recent)  Calculated C-SSRS Risk Score (Lifetime/Recent): No Risk  Indicated                Validity of evaluation is not impacted by presenting factors during interview n/a.   Comments regarding subjective versus objective responses to [place] tool: n/a  Environmental or Psychosocial Events: impulsivity/recklessness, unemployment/underemployment and ongoing abuse of substances  Chronic Risk Factors: history of psychiatric hospitalization   Warning Signs: increasing substance use or abuse and engaging in self-destructive behavior  Protective Factors: lives in a responsibly safe and stable environment and help seeking  Interpretation of Risk Scoring, Risk Mitigation Interventions and Safety Plan:          Does the patient have access to lethal means? No     Does the patient engage in non-suicidal self-injurious behavior (NSSI/SIB)? no     Does the patient have thoughts of harming others? No     Is the patient engaging in sexually inappropriate behavior?  no        Current Substance Abuse     Is there recent substance abuse? ETOH, hx of Meth     Was a urine drug screen or blood alcohol level obtained: Yes .45       Mental Status Exam     Affect: Appropriate   Appearance: Appropriate    Attention Span/Concentration: Attentive  Eye Contact: Engaged   Fund of Knowledge: Appropriate    Language /Speech Content: Fluent   Language /Speech Volume: Normal    Language /Speech Rate/Productions: Normal    Recent Memory: Intact   Remote Memory: Intact   Mood: Normal    Orientation to Person: Yes    Orientation to Place: Yes   Orientation to Time of Day: Yes    Orientation to Date: Yes    Situation (Do they understand why they are here?): Yes    Psychomotor Behavior: Normal    Thought Content: Clear   Thought Form: Intact      History of commitment: No       Medication    Psychotropic medications: No  Medication changes made in the last two weeks: No       Current Care Team    Primary Care Provider: No  Psychiatrist: No  Therapist: No  : No     CTSS or ARMHS: No  ACT Team: No  Other:  No      Diagnosis    300.00 (F41.9) Unspecified Anxiety Disorder   Substance-Related & Addictive Disorders 291.9 (F10.99) Unspecified Alcohol Related Disorder - by history       Clinical Summary and Substantiation of Recommendations    PT denies any current suicidal ideation, intent or planning.  PT denies any self harm. Pt denies any current homicidal ideation, intent or planning.  PT is able to engage in safety planning. Pt appears future and goal oriented.  PT is able to engage in a discussion about their protective factors.  PT does not currently appear to be in need of acute stabilization for overt psychosis, humberto, delusional thinking or paranoia.  PT is appropriate to transition into outpatient community services at this time.     At the time of discharge, the patient's acute suicide risk was determined to be low due to the following factors: Reduction in the intensity of mood/anxiety symptoms that preceded the admission, denial of suicidal thoughts, denies feeling helpless or helpless, not currently under the influence of alcohol or illicit substances, denies experiencing command hallucinations, no immediate access to firearms. The patient's acute risk could be higher if noncompliant with their treatment plan, medications, follow-up appointments or using illicit substances or alcohol. Protective factors include: social supports, stable housing   Disposition    Recommended disposition: Medication Management and Substance Abuse Disorder Treatment       Reviewed case and recommendations with attending provider. Attending Name: Dixon       Attending concurs with disposition: Yes       Patient concurs with disposition: Yes       Guardian concurs with disposition: NA      Final disposition: Medication management.     Outpatient Details (if applicable):   Aftercare plan and appointments placed in the AVS and provided to patient: Yes. Given to patient by RN    Was lethal means counseling provided as a part of  aftercare planning? No;       Assessment Details    Patient interview started at: 1400 and completed at: 1430.     Total duration spent on the patient case in minutes: .50 hrs      CPT code(s) utilized: 39761 - Psychotherapy for Crisis - 60 (30-74*) min       Dorene Phelps [facility],  Psychotherapist  [facility] - Triage & Transition Services  Callback: [phone]      Aftercare Plan    Follow up with established providers and supports as scheduled. Continue taking medications as prescribed. Abstain from drugs and alcohol. Utilize your [facility] mental [facility] crisis team as needed. They are available 24/7. Contact information is listed below.     If I am feeling unsafe or I am in a crisis, I will:   Contact my established care providers   Call the National Suicide Prevention Lifeline: 773.739.3359   Go to the nearest emergency room   Call [phone]     Warning signs that I or other people might notice when a crisis is developing for me: changes to sleep, appetite or mood, increased anger, agitation or irritability, feeling depressed or hopeless, spending more time alone or talking less, increased crying, decreased productivity, seeing or hearing things that aren't there, thoughts of not wanting to live anymore or of actually killing myself, thoughts of hurting others    Things I am able to do on my own to cope or help me feel better: watching a favorite tv show or movie, listening to music I enjoy, going outside and breathing fresh air, going for a walk or exercising, taking a shower or bath, a cold or hot beverage, a healthy snack, drawing/coloring/painting, journaling, singing or dancing, deep breathing     I can try practicing square breathing when I begin to feel anxious - inhale through the nose for the count of 4 and the first line on the square. Exhale through the mouth for the count of 4 for the second line of the square. Repeat to complete the square. Repeat the square as many times as needed.    I can also use my five senses  to practice mindfulness and grounding. What are five things I can see, four things I can hear, three things I can feel, two things I can smell, and one thing I can taste.     Things that I am able to do with others to cope or help me feel better: sometimes just talking or spending time with someone else, sharing a meal or having coffee, watching a movie or playing a game, going for a walk or exercising    I can also use community resources including mental health hotlines, [facility] crisis teams, or apps.     Things I can use or do for distraction: movies/tv, music, reading, games, drawing/coloring/painting or other art, essential oils, exercise, cleaning/organizing, puzzles, crossword puzzles, word search, Sudoku       I can also download a meditation or relaxation chavez, like Calm, Headspace, or Insight Timer (all three offer a free version)    A great website resource is Change to Chill with active coping skills    Changes I can make to support my mental health and wellness: Attend scheduled mental health therapy and psychiatric appointments. Take my medications as prescribed. Maintain a daily schedule/routine. Abstain from all mood altering substances, including drugs, alcohol, or medications not currently prescribed to me. Implement a self-care routine.      People in my life that I can ask for help: friends or family, trusted teachers/staff/colleagues, trusted members of my community or place of Rastafarian, mental health crisis lines, or 911    Your [facility] has a mental health crisis team you can call 24/7: [facility] Adult, [phone]]    Other things that are important when I m in crisis: to remember that the feelings I am having right now are temporary, and it won't feel like this forever, and that it is okay and important to ask for help    Crisis Lines  Crisis Text Line  Text 613061  You will be connected with a trained live crisis counselor to provide support.    Por june, marvelo  NICOLE a 867543 o texto  "a 442-AYNGAME en Tammie    National Hope Line  1.800.SUICIDE [5325025]      Community Resources  Fast Tracker  Linking people to mental health and substance use disorder resources  OpenCurriculum.Sensics     [place] Mental Health Warm Line  Peer to peer support  Monday thru Saturday, 12 pm to 10 pm  842.789.1681 or 4.252.701.7396  Text \"Support\" to 09331    National [place] on Mental Illness (TITA)  241.495.0401 or 1.888.TITA.HELPS      Mental Health Apps  My3  https://Lit Building Directorypp.org/    VirtualHopeBox  https://Zhanzuo/apps/virtual-hope-box/      Additional Information  Today you were seen by a licensed mental health professional through Triage and Transition services, Behavioral Healthcare Providers ([facility])  for a crisis assessment in the Emergency Department at [facility].  It is recommended that you follow up with your established providers (psychiatrist, mental health therapist, and/or primary care doctor - as relevant) as soon as possible. Coordinators from [facility] will be calling you in the next 24-48 hours to ensure that you have the resources you need.  You can also contact [facility] coordinators directly at [phone]. You may have been scheduled for or offered an appointment with a mental health provider. [facility] maintains an extensive network of licensed behavioral health providers to connect patients with the services they need.  We do not charge providers a fee to participate in our referral network.  We match patients with providers based on a patient's specific needs, insurance coverage, and location.  Our first effort will be to refer you to a provider within your care system, and will utilize providers outside your care system as needed.                    "

## 2022-11-18 NOTE — ED NOTES
"32 year old male with history of alcohol use disorder, anxiety received from ED due to reports of increased anxiety in relation to somatic complaints. Reports that his chest hurts and that he is worried that his liver is going to explode. *Pt was fixated on somatic complaints and getting something for his anxiety but reports no other concerns.  Patient reports that he used to take seroquel and buspar for anxiety but they were unhelpful so he doesn't take them anymore. Pt reports alcohol use 1-2 weeks when he is feeling \"pain or like I might die\". Denies any other substance use.         Denies SI/HI. Nursing and risk assessments completed. Assessments reviewed with LMHP and physician. Admission information reviewed with patient. Patient given a tour of [facility] and instructions on using the facility. Questions regarding [facility] addressed. Pt safety search completed.    "

## 2022-11-19 NOTE — ED NOTES
Patient escorted off the unit at 1800 accompanied by [facility] staff. Discharged to home via cab. Mood appeared stable with a hopeful affect. Patient agreed to discharge plan. Discharge instructions reviewed with patient including follow-up care plan. Two medications sent home with patient from our pharmacy, Depakote and Clonidine. Reviewed safety plan and outpatient resources. Denied SI and HI. All belongings that were brought into the hospital have been returned to patient including two cell phones.

## 2022-12-26 ENCOUNTER — TELEPHONE (OUTPATIENT)
Dept: BEHAVIORAL HEALTH | Facility: CLINIC | Age: 32
End: 2022-12-26

## 2022-12-26 ENCOUNTER — APPOINTMENT (OUTPATIENT)
Dept: CT IMAGING | Facility: CLINIC | Age: 32
End: 2022-12-26
Attending: EMERGENCY MEDICINE
Payer: COMMERCIAL

## 2022-12-26 ENCOUNTER — HOSPITAL ENCOUNTER (EMERGENCY)
Facility: CLINIC | Age: 32
Discharge: HOME | End: 2022-12-26
Attending: EMERGENCY MEDICINE | Admitting: EMERGENCY MEDICINE
Payer: COMMERCIAL

## 2022-12-26 ENCOUNTER — APPOINTMENT (OUTPATIENT)
Dept: ULTRASOUND IMAGING | Facility: CLINIC | Age: 32
End: 2022-12-26
Attending: EMERGENCY MEDICINE
Payer: COMMERCIAL

## 2022-12-26 VITALS
OXYGEN SATURATION: 97 % | TEMPERATURE: 98.3 F | DIASTOLIC BLOOD PRESSURE: 90 MMHG | RESPIRATION RATE: 18 BRPM | SYSTOLIC BLOOD PRESSURE: 137 MMHG | HEART RATE: 75 BPM

## 2022-12-26 DIAGNOSIS — R07.9 CHEST PAIN, UNSPECIFIED TYPE: ICD-10-CM

## 2022-12-26 DIAGNOSIS — F10.920 ALCOHOLIC INTOXICATION WITHOUT COMPLICATION (H): ICD-10-CM

## 2022-12-26 DIAGNOSIS — R10.11 ABDOMINAL PAIN, RIGHT UPPER QUADRANT: ICD-10-CM

## 2022-12-26 LAB
ALBUMIN SERPL BCG-MCNC: 4.8 G/DL (ref 3.5–5.2)
ALP SERPL-CCNC: 59 U/L (ref 40–129)
ALT SERPL W P-5'-P-CCNC: 23 U/L (ref 10–50)
ANION GAP SERPL CALCULATED.3IONS-SCNC: 13 MMOL/L (ref 7–15)
AST SERPL W P-5'-P-CCNC: 28 U/L (ref 10–50)
ATRIAL RATE - MUSE: 107 BPM
BASOPHILS # BLD AUTO: 0.1 10E3/UL (ref 0–0.2)
BASOPHILS NFR BLD AUTO: 1 %
BILIRUB SERPL-MCNC: 0.4 MG/DL
BUN SERPL-MCNC: 16.2 MG/DL (ref 6–20)
CALCIUM SERPL-MCNC: 8.8 MG/DL (ref 8.6–10)
CHLORIDE SERPL-SCNC: 94 MMOL/L (ref 98–107)
CREAT SERPL-MCNC: 0.99 MG/DL (ref 0.67–1.17)
D DIMER PPP FEU-MCNC: 2.8 UG/ML FEU (ref 0–0.5)
DEPRECATED HCO3 PLAS-SCNC: 25 MMOL/L (ref 22–29)
DIASTOLIC BLOOD PRESSURE - MUSE: NORMAL MMHG
EOSINOPHIL # BLD AUTO: 0.1 10E3/UL (ref 0–0.7)
EOSINOPHIL NFR BLD AUTO: 1 %
ERYTHROCYTE [DISTWIDTH] IN BLOOD BY AUTOMATED COUNT: 13.1 % (ref 10–15)
ETHANOL SERPL-MCNC: 0.18 G/DL
GFR SERPL CREATININE-BSD FRML MDRD: >90 ML/MIN/1.73M2
GLUCOSE SERPL-MCNC: 114 MG/DL (ref 70–99)
HCT VFR BLD AUTO: 39.6 % (ref 40–53)
HGB BLD-MCNC: 13 G/DL (ref 13.3–17.7)
IMM GRANULOCYTES # BLD: 0 10E3/UL
IMM GRANULOCYTES NFR BLD: 0 %
INTERPRETATION ECG - MUSE: NORMAL
LIPASE SERPL-CCNC: 32 U/L (ref 13–60)
LYMPHOCYTES # BLD AUTO: 1.9 10E3/UL (ref 0.8–5.3)
LYMPHOCYTES NFR BLD AUTO: 17 %
MCH RBC QN AUTO: 31 PG (ref 26.5–33)
MCHC RBC AUTO-ENTMCNC: 32.8 G/DL (ref 31.5–36.5)
MCV RBC AUTO: 95 FL (ref 78–100)
MONOCYTES # BLD AUTO: 0.7 10E3/UL (ref 0–1.3)
MONOCYTES NFR BLD AUTO: 6 %
NEUTROPHILS # BLD AUTO: 8.2 10E3/UL (ref 1.6–8.3)
NEUTROPHILS NFR BLD AUTO: 75 %
NRBC # BLD AUTO: 0 10E3/UL
NRBC BLD AUTO-RTO: 0 /100
P AXIS - MUSE: 56 DEGREES
PLATELET # BLD AUTO: 257 10E3/UL (ref 150–450)
POTASSIUM SERPL-SCNC: 4.4 MMOL/L (ref 3.4–5.3)
PR INTERVAL - MUSE: 136 MS
PROT SERPL-MCNC: 7.4 G/DL (ref 6.4–8.3)
QRS DURATION - MUSE: 92 MS
QT - MUSE: 324 MS
QTC - MUSE: 432 MS
R AXIS - MUSE: 10 DEGREES
RBC # BLD AUTO: 4.19 10E6/UL (ref 4.4–5.9)
SODIUM SERPL-SCNC: 132 MMOL/L (ref 136–145)
SYSTOLIC BLOOD PRESSURE - MUSE: NORMAL MMHG
T AXIS - MUSE: 58 DEGREES
TROPONIN T SERPL HS-MCNC: 8 NG/L
TROPONIN T SERPL HS-MCNC: 9 NG/L
VENTRICULAR RATE- MUSE: 107 BPM
WBC # BLD AUTO: 11 10E3/UL (ref 4–11)

## 2022-12-26 PROCEDURE — 82077 ASSAY SPEC XCP UR&BREATH IA: CPT | Performed by: EMERGENCY MEDICINE

## 2022-12-26 PROCEDURE — 96374 THER/PROPH/DIAG INJ IV PUSH: CPT | Mod: 59

## 2022-12-26 PROCEDURE — 36415 COLL VENOUS BLD VENIPUNCTURE: CPT | Performed by: EMERGENCY MEDICINE

## 2022-12-26 PROCEDURE — 250N000011 HC RX IP 250 OP 636: Performed by: EMERGENCY MEDICINE

## 2022-12-26 PROCEDURE — 85379 FIBRIN DEGRADATION QUANT: CPT | Performed by: EMERGENCY MEDICINE

## 2022-12-26 PROCEDURE — 74177 CT ABD & PELVIS W/CONTRAST: CPT

## 2022-12-26 PROCEDURE — 258N000003 HC RX IP 258 OP 636: Performed by: EMERGENCY MEDICINE

## 2022-12-26 PROCEDURE — 99285 EMERGENCY DEPT VISIT HI MDM: CPT | Mod: 25

## 2022-12-26 PROCEDURE — 85025 COMPLETE CBC W/AUTO DIFF WBC: CPT | Performed by: EMERGENCY MEDICINE

## 2022-12-26 PROCEDURE — C9113 INJ PANTOPRAZOLE SODIUM, VIA: HCPCS | Performed by: EMERGENCY MEDICINE

## 2022-12-26 PROCEDURE — 96375 TX/PRO/DX INJ NEW DRUG ADDON: CPT | Mod: 59

## 2022-12-26 PROCEDURE — 93005 ELECTROCARDIOGRAM TRACING: CPT

## 2022-12-26 PROCEDURE — 250N000013 HC RX MED GY IP 250 OP 250 PS 637: Performed by: EMERGENCY MEDICINE

## 2022-12-26 PROCEDURE — 80053 COMPREHEN METABOLIC PANEL: CPT | Performed by: EMERGENCY MEDICINE

## 2022-12-26 PROCEDURE — 96361 HYDRATE IV INFUSION ADD-ON: CPT

## 2022-12-26 PROCEDURE — 76705 ECHO EXAM OF ABDOMEN: CPT

## 2022-12-26 PROCEDURE — 83690 ASSAY OF LIPASE: CPT | Performed by: EMERGENCY MEDICINE

## 2022-12-26 PROCEDURE — 84484 ASSAY OF TROPONIN QUANT: CPT | Performed by: EMERGENCY MEDICINE

## 2022-12-26 PROCEDURE — 84484 ASSAY OF TROPONIN QUANT: CPT | Mod: 91 | Performed by: EMERGENCY MEDICINE

## 2022-12-26 RX ORDER — LORAZEPAM 2 MG/ML
1 INJECTION INTRAMUSCULAR ONCE
Status: COMPLETED | OUTPATIENT
Start: 2022-12-26 | End: 2022-12-26

## 2022-12-26 RX ORDER — IOPAMIDOL 755 MG/ML
500 INJECTION, SOLUTION INTRAVASCULAR ONCE
Status: COMPLETED | OUTPATIENT
Start: 2022-12-26 | End: 2022-12-26

## 2022-12-26 RX ORDER — IBUPROFEN 600 MG/1
600 TABLET, FILM COATED ORAL ONCE
Status: COMPLETED | OUTPATIENT
Start: 2022-12-26 | End: 2022-12-26

## 2022-12-26 RX ORDER — LORAZEPAM 1 MG/1
1 TABLET ORAL ONCE
Status: COMPLETED | OUTPATIENT
Start: 2022-12-26 | End: 2022-12-26

## 2022-12-26 RX ADMIN — PANTOPRAZOLE SODIUM 40 MG: 40 INJECTION, POWDER, FOR SOLUTION INTRAVENOUS at 11:53

## 2022-12-26 RX ADMIN — LORAZEPAM 1 MG: 1 TABLET ORAL at 13:14

## 2022-12-26 RX ADMIN — IOPAMIDOL 90 ML: 755 INJECTION, SOLUTION INTRAVENOUS at 09:04

## 2022-12-26 RX ADMIN — LORAZEPAM 1 MG: 2 INJECTION INTRAMUSCULAR; INTRAVENOUS at 08:30

## 2022-12-26 RX ADMIN — IBUPROFEN 600 MG: 600 TABLET, FILM COATED ORAL at 07:15

## 2022-12-26 RX ADMIN — SODIUM CHLORIDE 86 ML: 9 INJECTION, SOLUTION INTRAVENOUS at 09:04

## 2022-12-26 ASSESSMENT — ACTIVITIES OF DAILY LIVING (ADL)
ADLS_ACUITY_SCORE: 35

## 2022-12-26 ASSESSMENT — ENCOUNTER SYMPTOMS
BLOOD IN STOOL: 0
COUGH: 0
VOMITING: 0
NAUSEA: 0
DYSURIA: 0
SHORTNESS OF BREATH: 1
DIARRHEA: 0
CHILLS: 0
ABDOMINAL PAIN: 1

## 2022-12-26 NOTE — ED NOTES
Patient c/o chest pain and wants something for pain. Patient was medicated with ibuprofen. Patient still not happy with ibuprofen and wants something else. Patient informed that he have to wait until he goes back to a room to get assess by an MD prior to any stronger pain medication. Patient stated that he is having a heart attack. Patient reassure that he is not having a heart attack. EKG was done. Patient also decline blood draw. Patient requesting to see his ekg paper which this writer did show it to him. Patient took his ekg paper and walked away. Patient informed that he cannot take the ekg paper as it is medical record. Patient refuse to return the ekg paper and remains uncooperative. Security called. Patient took a picture of the ekg with his phone and did eventually return the ekg paper. Patient remains unhappy about not being able to go back immediately or getting anything for pain. Patient informed to wait in the lobby until a room is available. In no apparent acute distress. Will continue to monitor.

## 2022-12-26 NOTE — ED PROVIDER NOTES
History     Chief Complaint:  Chest Pain       HPI   Marshall Yoon is a 32 year old male who presents for evaluation of chest pain and abdominal pain.  He states about 3 hours ago, he developed left-sided chest pain.  Its pleuritic in nature.  He also has pain in his right side of his abdomen.  He drinks alcohol regularly but last used alcohol last night.  He feels slightly shaky.  He has no history of any cardiac or respiratory chronic illnesses.  He denies any nausea, vomiting, or diarrhea.  No trauma.  No blood in the stool.    ROS:  Review of Systems   Constitutional: Negative for chills.   Respiratory: Positive for shortness of breath. Negative for cough.    Cardiovascular: Positive for chest pain.   Gastrointestinal: Positive for abdominal pain. Negative for blood in stool, diarrhea, nausea and vomiting.   Genitourinary: Negative for dysuria.   All other systems reviewed and are negative.        Allergies:  No Known Allergies     Medications:    cloNIDine (CATAPRES) 0.1 MG tablet  divalproex sodium delayed-release (DEPAKOTE) 500 MG DR tablet  multivitamin w/minerals (THERA-VIT-M) tablet        Past Medical History:    Past Medical History:   Diagnosis Date     Anxiety        Past Surgical History:    Past Surgical History:   Procedure Laterality Date     EXTRACTION(S) DENTAL N/A 1/13/2022    Procedure: EXTRACTION, TOOTH 17 and 18 and 32 with incision and drainage, extraoral left;  Surgeon: Saleem Horn DDS;  Location:  OR        Family History:    family history is not on file.    Social History:   reports that he has been smoking. He has been smoking an average of .25 packs per day. He has never used smokeless tobacco. He reports that he does not drink alcohol.  PCP: No Ref-Primary, Physician     Physical Exam     Patient Vitals for the past 24 hrs:   BP Temp Temp src Pulse Resp SpO2   12/26/22 0704 (!) 190/102 98.3  F (36.8  C) Temporal 104 18 98 %        Physical Exam  Constitutional: Well  appearing.  HEENT: Atraumatic.  PERRL.  EOMI.  Moist mucous membranes.  Neck: Soft.  Supple.  No JVD.  Cardiac: Regular rate and rhythm.  No murmur or rub.  There is reproducible chest wall tenderness of the left.  No visit bruising, swelling, or rash noted.  Respiratory: Clear to auscultation bilaterally.  No respiratory distress.  No wheezing, rhonchi, or rales.  Abdomen: Soft with right upper and lower abdominal tenderness to palpation no rebound or guarding.  Nondistended.  Musculoskeletal: No edema.  Normal range of motion.  Neurologic: Alert and oriented x3.  Normal tone and bulk.  Normal gait.  Skin: No rashes.  No edema.  Psych: Normal affect.  Normal behavior.          Emergency Department Course   ECG:  ECG results from 12/26/22   EKG 12-lead, tracing only     Value    Systolic Blood Pressure     Diastolic Blood Pressure     Ventricular Rate 107    Atrial Rate 107    ID Interval 136    QRS Duration 92        QTc 432    P Axis 56    R AXIS 10    T Axis 58    Interpretation ECG      Sinus tachycardia  Possible Left atrial enlargement  Borderline ECG  When compared with ECG of 17-NOV-2022 16:12,  Vent. rate has increased BY  39 BPM  Nonspecific T wave abnormality now evident in Lateral leads         Imaging:  Abdomen US, limited (RUQ only)   Final Result   IMPRESSION:   Unremarkable right upper quadrant ultrasound. No cause for right upper   quadrant pain is identified.      MISAEL PADRON MD            SYSTEM ID:  [id number]      CT Chest (PE) Abdomen Pelvis w Contrast   Final Result   IMPRESSION: No acute abnormality in the chest, abdomen, or pelvis. No   evidence for pulmonary embolism.       MISAEL PADRON MD            SYSTEM ID:  [id number]         Report per radiology    Laboratory:  Labs Ordered and Resulted from Time of ED Arrival to Time of ED Departure   COMPREHENSIVE METABOLIC PANEL - Abnormal       Result Value    Sodium 132 (*)     Potassium 4.4      Chloride 94 (*)     Carbon Dioxide  (CO2) 25      Anion Gap 13      Urea Nitrogen 16.2      Creatinine 0.99      Calcium 8.8      Glucose 114 (*)     Alkaline Phosphatase 59      AST 28      ALT 23      Protein Total 7.4      Albumin 4.8      Bilirubin Total 0.4      GFR Estimate >90     D DIMER QUANTITATIVE - Abnormal    D-Dimer Quantitative 2.80 (*)    ETHYL ALCOHOL LEVEL - Abnormal    Alcohol ethyl 0.18 (*)    CBC WITH PLATELETS AND DIFFERENTIAL - Abnormal    WBC Count 11.0      RBC Count 4.19 (*)     Hemoglobin 13.0 (*)     Hematocrit 39.6 (*)     MCV 95      MCH 31.0      MCHC 32.8      RDW 13.1      Platelet Count 257      % Neutrophils 75      % Lymphocytes 17      % Monocytes 6      % Eosinophils 1      % Basophils 1      % Immature Granulocytes 0      NRBCs per 100 WBC 0      Absolute Neutrophils 8.2      Absolute Lymphocytes 1.9      Absolute Monocytes 0.7      Absolute Eosinophils 0.1      Absolute Basophils 0.1      Absolute Immature Granulocytes 0.0      Absolute NRBCs 0.0     TROPONIN T, HIGH SENSITIVITY - Normal    Troponin T, High Sensitivity 9     LIPASE - Normal    Lipase 32          Procedures       Emergency Department Course:             Reviewed:  I reviewed nursing notes, vitals, past medical history and Care Everywhere    Interventions:  Medications   ibuprofen (ADVIL/MOTRIN) tablet 600 mg (600 mg Oral Given 12/26/22 0715)   LORazepam (ATIVAN) injection 1 mg (1 mg Intravenous Given 12/26/22 0830)   0.9% sodium chloride BOLUS (0 mLs Intravenous Stopped 12/26/22 1115)   iopamidol (ISOVUE-370) solution 500 mL (90 mLs Intravenous Given 12/26/22 0904)   pantoprazole (PROTONIX) IV push injection 40 mg (40 mg Intravenous Given 12/26/22 1153)   lidocaine (viscous) (XYLOCAINE) 2 % 15 mL, alum & mag hydroxide-simethicone (MAALOX) 15 mL GI Cocktail (30 mLs Oral Not Given 12/26/22 1309)   LORazepam (ATIVAN) tablet 1 mg (1 mg Oral Given 12/26/22 1314)        Disposition:  The patient was discharged to home.     Impression & Plan    Medical  Decision Making:  Marshall Yoon is a 32-year-old man is afebrile.  EKG demonstrates no acute ischemic changes.  Initial troponin is within normal limits.  Delta troponin is not significantly changed and ACS is noted.  He has a very low heart score and can safely discharge home outpatient follow-up.  He also complained of right sided abdominal pain.  CT scan of the abdomen pelvis and right upper quadrant ultrasound revealed no evidence of acute abnormalities.  Pain was controlled as above.  He requesting going to detox, however, he has outstanding warrants and law enforcement is present.  He will not be accepted to detox due to his outstanding law enforcement issues.  At this time, he does appear safe to discharge with law enforcement has a showing no signs of withdrawal.  Return precautions were given and he was in no distress at time of discharge.    Diagnosis:    ICD-10-CM    1. Alcoholic intoxication without complication (H)  F10.920       2. Chest pain, unspecified type  R07.9       3. Abdominal pain, right upper quadrant  R10.11            Discharge Medications:  New Prescriptions    No medications on file        12/26/2022   Guzman Sullivan MD Salay, Nicholas J, MD  01/04/23 2035

## 2022-12-26 NOTE — ED TRIAGE NOTES
CC: Right knee pain    Drake Barraza is a 75 y.o. male with 5 year history of Right knee pain. Pain is worse with activity and weight bearing.  Patient has experienced interference of activities of daily living due to decreased range of motion and an increase in joint pain and swelling.  Patient has failed non-operative treatment including NSAIDs and activity modification.  Drake Barraza currently ambulates independently.     Relevant medical conditions of significance in perioperative period:  CAD: inferior MI 2003 with stents, on ASA. Recent w/u by Dr. Read.   HTN: on meds followed by PCP   MICHELLE: uses CPAP  Hypothyroidism: on synthroid    Past Medical History:   Diagnosis Date    Arthritis     CAD (coronary artery disease) 2003    RCA Stent 11/2003, s/p LAD Stent 12/2003                                                       Hyperlipidemia     Hypertension     Old myocardial infarct 11/2003      Inferior NSTEMI     MICHELLE on CPAP     Home CPAP at 9cm/Face Mask    Thyroid disease        Past Surgical History:   Procedure Laterality Date    COLONOSCOPY N/A 7/27/2016    Performed by Ariel Cabral MD at [facility] ([facility])       Family History   Problem Relation Age of Onset    Melanoma Neg Hx        Review of patient's allergies indicates:   Allergen Reactions    Hydrocodone-acetaminophen Rash     Other reaction(s): constipated         Current Outpatient Medications:     aspirin 81 mg Tab, Take 1 tablet by mouth Daily.  , Disp: , Rfl:     atorvastatin (LIPITOR) 40 MG tablet, TAKE 1 TABLET EVERY DAY, Disp: 90 tablet, Rfl: 3    CINNAMON BARK (CINNAMON ORAL), Take 1 capsule by mouth once daily., Disp: , Rfl:     levothyroxine (SYNTHROID) 125 MCG tablet, 1 tab in AM on empty stomach, Disp: 90 tablet, Rfl: 1    metoprolol tartrate (LOPRESSOR) 25 MG tablet, TAKE 1 TABLET TWICE DAILY, Disp: 180 tablet, Rfl: 3    multivitamin (ONE DAILY MULTIVITAMIN) per tablet, Take 1 tablet by mouth once daily.,  Here for concern of left sided chest pain started about 3 days ago associated with sob, right sided abdominal pain, dizziness, n/v. Did drank alcohol today as well. ABCs intact.      Triage Assessment       Row Name 12/26/22 0703       Triage Assessment (Adult)    Airway WDL WDL       Respiratory WDL    Respiratory WDL WDL       Cardiac WDL    Cardiac WDL chest pain                   "Disp: , Rfl:     nitroGLYCERIN (NITROSTAT) 0.4 MG SL tablet, Place 1 tablet (0.4 mg total) under the tongue every 5 (five) minutes as needed for Chest pain., Disp: 100 tablet, Rfl: 2    omega-3 fatty acids-vitamin E (FISH OIL) 1,000 mg Cap, Take 1 capsule by mouth Daily.  , Disp: , Rfl:     ramipril (ALTACE) 10 MG capsule, TAKE 1 CAPSULE EVERY DAY, Disp: 90 capsule, Rfl: 3    sildenafil (VIAGRA) 100 MG tablet, Take 1 tablet (100 mg total) by mouth daily as needed for Erectile Dysfunction., Disp: 10 tablet, Rfl: 6    TURMERIC ROOT EXTRACT ORAL, Take 1 capsule by mouth., Disp: , Rfl:     UBIDECARENONE (COENZYME Q10) 100 mg Tab, Take 200 mg by mouth once daily. , Disp: , Rfl:     vitamin D 185 MG Tab, Take 185 mg by mouth once daily.  , Disp: , Rfl:     Review of Systems:  Constitutional: no fever or chills  Eyes: no visual changes  ENT: no nasal congestion or sore throat  Respiratory: no cough or shortness of breath  Cardiovascular: no chest pain or palpitations  Gastrointestinal: no nausea or vomiting, tolerating diet  Genitourinary: no hematuria or dysuria  Integument/Breast: no rash or pruritis  Hematologic/Lymphatic: no easy bruising or lymphadenopathy  Musculoskeletal: positive for knee pain  Neurological: no seizures or tremors  Behavioral/Psych: no auditory or visual hallucinations  Endocrine: no heat or cold intolerance    PE:  Ht 5' 7" (1.702 m)   Wt 79.4 kg (175 lb 0.7 oz)   BMI 27.42 kg/m²   General: Pleasant, cooperative, NAD   Gait: antalgic  HEENT: NCAT, sclera nonicteric   Lungs: Respirations clear bilaterally; equal and unlabored.   CV: S1S2; 2+ bilateral upper and lower extremity pulses.   Skin: Intact throughout with no rashes, erythema, or lesions  Extremities: No LE edema,  no erythema or warmth of the skin in either lower extremity.    Right knee exam:  Knee Range of Motion: active, pain with passive range of motion  Effusion:none  Condition of skin:intact  Location of " tenderness:Medial joint line   Strength:5 of 5 quadriceps strength and 5 of 5 hamstring strength  Stability: stable to testing    Hip Examination:full painless range of motion, without tenderness    Radiographs: Radiographs reveal advanced degenerative changes including subchondral cyst formation, subchondral sclerosis, osteophyte formation, joint space narrowing.     Knee Alignment:  Moderate varus    Diagnosis: osteoarthritis Right knee    Plan: Right total knee arthroplasty    Due to the serious nature of total joint infection and the high prevalence of community acquired MRSA, vancomycin will be used perioperatively.

## 2022-12-26 NOTE — DISCHARGE INSTRUCTIONS
Discharge Instructions  Chest Pain    You have been seen today for chest pain or discomfort.  At this time, your provider has found no signs that your chest pain is due to a serious or life-threatening condition, (or you have declined more testing and/or admission to the hospital). However, sometimes there is a serious problem that does not show up right away. Your evaluation today may not be complete and you may need further testing and evaluation.     Generally, every Emergency Department visit should have a follow-up clinic visit with either a primary or a specialty clinic/provider. Please follow-up as instructed by your emergency provider today.  Return to the Emergency Department if:  Your chest pain changes, gets worse, starts to happen more often, or comes with less activity.  You are newly short of breath.  You get very weak or tired.  You pass out or faint.  You have any new symptoms, like fever, cough, numb legs, or you cough up blood.  You have anything else that worries you.    Until you follow-up with your regular provider, please do the following:  Take one aspirin daily unless you have an allergy or are told not to by your provider.  If a stress test appointment has been made, go to the appointment.  If you have questions, contact your regular provider.  Follow-up with your regular provider/clinic as directed; this is very important.    If you were given a prescription for medicine here today, be sure to read all of the information (including the package insert) that comes with your prescription.  This will include important information about the medicine, its side effects, and any warnings that you need to know about.  The pharmacist who fills the prescription can provide more information and answer questions you may have about the medicine.  If you have questions or concerns that the pharmacist cannot address, please call or return to the Emergency Department.       Remember that you can always come  back to the Emergency Department if you are not able to see your regular provider in the amount of time listed above, if you get any new symptoms, or if there is anything that worries you.    Discharge Instructions  Alcohol Intoxication    You have been seen today with alcohol intoxication. This means that you have enough alcohol in your system to impair your ability to mentally and physically function, perhaps to the extent that you were unable to care for yourself.    Generally, every Emergency Department visit should have a follow-up clinic visit with either a primary or a specialty clinic/provider. Please follow-up as instructed by your emergency provider today.    You may have come to the Emergency Department because of your intoxication, or for another reason, such as because of an injury. No matter what the case is, this visit is a  red flag  regarding alcohol use, and you should consider whether your drinking pattern is a problem for you.     You may be at risk for alcohol-related problems if:    Men: you drink more than 14 drinks per week, or more than 4 drinks per occasion.    Women: you drink more than 7 drinks per week or more than 3 drinks per occasion.    You have black-outs.  You do things you regret while drinking.  You have legal problems because of drinking.  You have job problems because of drinking (you call in sick to work because of drinking).    CAGE Questions  Have you ever felt you should cut down on your drinking?  Have people annoyed you by criticizing your drinking?  Have you ever felt bad or guilty about your drinking?  Have you ever had a drink first thing in the morning to steady your nerves or get rid of a hangover (eye opener)?    If you answer yes to any of the CAGE questions, you may have a problem with alcohol.      Return to the Emergency Department if:  You become shaky or tremble when you try to stop drinking.   You have severe abdominal pain (belly pain).   You have a seizure  or pass out.    You vomit (throw up) blood or have blood in your stool. This may be bright red or it may look like black coffee grounds.  You become lightheaded or faint.      For further help, contact:   Your caregiver.    Alcoholics Anonymous (AA).    [facility] Inter[facility]: [phone]  [facility] Central Office: [phone]   A drug or alcohol rehabilitation program.    You can get information on alcohol resources and groups by calling the number 211 or [phone] on any phone.     Seek medical care if:  You have persistent vomiting.   You have persistent pain in any part of your body.    You do not feel better after a few days.    If you were given a prescription for medicine here today, be sure to read all of the information (including the package insert) that comes with your prescription.  This will include important information about the medicine, its side effects, and any warnings that you need to know about.  The pharmacist who fills the prescription can provide more information and answer questions you may have about the medicine.  If you have questions or concerns that the pharmacist cannot address, please call or return to the Emergency Department.   Remember that you can always come back to the Emergency Department if you are not able to see your regular doctor in the amount of time listed above, if you get any new symptoms, or if there is anything that worries you.    Discharge Instructions  Abdominal Pain    Abdominal pain (belly pain) can be caused by many things. Your evaluation today does not show the exact cause for your pain. Your provider today has decided that it is unlikely your pain is due to a life threatening problem, or a problem requiring surgery or hospital admission. Sometimes those problems cannot be found right away, so it is very important that you follow up as directed.  Sometimes only the changes which occur over time allow the cause of your pain to  be found.    Generally, every Emergency Department visit should have a follow-up clinic visit with either a primary or a specialty clinic/provider. Please follow-up as instructed by your emergency provider today. With abdominal pain, we often recommend very close follow-up, such as the following day.    ADULTS:  Return to the Emergency Department right away if:    You get an oral temperature above 102oF or as directed by your provider.  You have blood in your stools. This may be bright red or appear as black, tarry stools.    You keep vomiting (throwing up) or cannot drink liquids.  You see blood when you vomit.   You cannot have a bowel movement or you cannot pass gas.  Your stomach gets bloated or bigger.  Your skin or the whites of your eyes look yellow.  You faint.  You have bloody, frequent or painful urination (peeing).  You have new symptoms or anything that worries you.    CHILDREN:  Return to the Emergency Department right away if your child has any of the above-listed symptoms or the following:    Pushes your hand away or screams/cries when his/her belly is touched.  You notice your child is very fussy or weak.  Your child is very tired and is too tired to eat or drink.  Your child is dehydrated.  Signs of dehydration can be:  Significant change in the amount of wet diapers/urine.  Your infant or child starts to have dry mouth and lips, or no saliva (spit) or tears.    PREGNANT WOMEN:  Return to the Emergency Department right away if you have any of the above-listed symptoms or the following:    You have bleeding, leaking fluid or passing tissue from the vagina.  You have worse pain or cramping, or pain in your shoulder or back.  You have vomiting that will not stop.  You have a temperature of 100oF or more.  Your baby is not moving as much as usual.  You faint.  You get a bad headache with or without eye problems and abdominal pain.  You have a seizure.  You have unusual discharge from your vagina and  "abdominal pain.    Abdominal pain is pretty common during pregnancy.  Your pain may or may not be related to your pregnancy. You should follow-up closely with your OB provider so they can evaluate you and your baby.  Until you follow-up with your regular provider, do the following:     Avoid sex and do not put anything in your vagina.  Drink clear fluids.  Only take medications approved by your provider.    MORE INFORMATION:    Appendicitis:  A possible cause of abdominal pain in any person who still has their appendix is acute appendicitis. Appendicitis is often hard to diagnose.  Testing does not always rule out early appendicitis or other causes of abdominal pain. Close follow-up with your provider and re-evaluations may be needed to figure out the reason for your abdominal pain.    Follow-up:  It is very important that you make an appointment with your clinic and go to the appointment.  If you do not follow-up with your primary provider, it may result in missing an important development which could result in permanent injury or disability and/or lasting pain.  If there is any problem keeping your appointment, call your provider or return to the Emergency Department.    Medications:  Take your medications as directed by your provider today.  Before using over-the-counter medications, ask your provider and make sure to take the medications as directed.  If you have any questions about medications, ask your provider.    Diet:  Resume your normal diet as much as possible, but do not eat fried, fatty or spicy foods while you have pain.  Do not drink alcohol or have caffeine.  Do not smoke tobacco.    Probiotics: If you have been given an antibiotic, you may want to also take a probiotic pill or eat yogurt with live cultures. Probiotics have \"good bacteria\" to help your intestines stay healthy. Studies have shown that probiotics help prevent diarrhea (loose stools) and other intestine problems (including C. diff " infection) when you take antibiotics. You can buy these without a prescription in the pharmacy section of the store.     If you were given a prescription for medicine here today, be sure to read all of the information (including the package insert) that comes with your prescription.  This will include important information about the medicine, its side effects, and any warnings that you need to know about.  The pharmacist who fills the prescription can provide more information and answer questions you may have about the medicine.  If you have questions or concerns that the pharmacist cannot address, please call or return to the Emergency Department.       Remember that you can always come back to the Emergency Department if you are not able to see your regular provider in the amount of time listed above, if you get any new symptoms, or if there is anything that worries you.

## 2022-12-26 NOTE — TELEPHONE ENCOUNTER
"S: [facility] ED ,Originally called by the [facility] ([facility]) for clinical then Intake transferred to pt's Nurse calling at 12:30p with clinical on a 32 year old/Male presenting for alcohol detox.     B: Pt presents for ETOH detox. Sober for a week then went on a pickard. Pt reports drinking \" a lot\" unknown amount and unknown what pt was drinking.  Patient reports last use was last night 12/25/2022.  Pt MARIA LUISA: 0.18  Pt  endorses hx of DT  Pt  denies hx of seizures. Last seizure: N/A  Pt endorsing the following symptoms of withdrawal: None  MSSA Score was not complete, instead CIWA completed with score of 8.    Pt denies acute mental health or medical concerns.   Pt denies other drug use: None Amount/frequency: N/A    Does Pt have a detox care plan in [facility]? No  Does pt present with specific needs, assistive devices, or exclusionary criteria? None  Is the patient ambulating, eating and drinking in the ED? Yes    A: Pt meets criteria to be presented for IP detox admission. Patient is voluntary  COVID: Ordered, not yet collected  Utox: Not ordered, intake to request lab   CMP: Abnormalities: Sodium 132, Chloride 94, Glucose 114  CBC: Abnormalities: RBC count 4.19, Hemoglobin 13, Hematocrit 39.6  HCG: N/A     R: Patient cleared and ready for behavioral bed placement: Yes    Presenting for admission to 3A/CD     Intake notes needing COVID and UDS.     1:44p Intake called [facility] ([facility]) about update on pt. [facility] reported that pt has been arrested and no longer needs IP Detox services. Intake has removed pt from work list.   "

## 2023-05-06 ENCOUNTER — HEALTH MAINTENANCE LETTER (OUTPATIENT)
Age: 33
End: 2023-05-06

## 2023-08-22 ENCOUNTER — APPOINTMENT (OUTPATIENT)
Dept: CT IMAGING | Facility: CLINIC | Age: 33
End: 2023-08-22
Attending: EMERGENCY MEDICINE
Payer: COMMERCIAL

## 2023-08-22 ENCOUNTER — HOSPITAL ENCOUNTER (EMERGENCY)
Facility: CLINIC | Age: 33
Discharge: HOME | End: 2023-08-22
Attending: EMERGENCY MEDICINE | Admitting: EMERGENCY MEDICINE
Payer: COMMERCIAL

## 2023-08-22 ENCOUNTER — APPOINTMENT (OUTPATIENT)
Dept: GENERAL RADIOLOGY | Facility: CLINIC | Age: 33
End: 2023-08-22
Attending: EMERGENCY MEDICINE
Payer: COMMERCIAL

## 2023-08-22 VITALS
BODY MASS INDEX: 27.77 KG/M2 | TEMPERATURE: 98.5 F | WEIGHT: 205 LBS | HEIGHT: 72 IN | HEART RATE: 51 BPM | RESPIRATION RATE: 16 BRPM | DIASTOLIC BLOOD PRESSURE: 95 MMHG | OXYGEN SATURATION: 98 % | SYSTOLIC BLOOD PRESSURE: 150 MMHG

## 2023-08-22 DIAGNOSIS — I10 HYPERTENSION, UNSPECIFIED TYPE: ICD-10-CM

## 2023-08-22 DIAGNOSIS — R07.9 CHEST PAIN, UNSPECIFIED TYPE: ICD-10-CM

## 2023-08-22 LAB
ALBUMIN SERPL BCG-MCNC: 4.6 G/DL (ref 3.5–5.2)
ALP SERPL-CCNC: 44 U/L (ref 40–129)
ALT SERPL W P-5'-P-CCNC: 39 U/L (ref 0–70)
ANION GAP SERPL CALCULATED.3IONS-SCNC: 11 MMOL/L (ref 7–15)
AST SERPL W P-5'-P-CCNC: 40 U/L (ref 0–45)
ATRIAL RATE - MUSE: 56 BPM
ATRIAL RATE - MUSE: 63 BPM
BASOPHILS # BLD AUTO: 0.1 10E3/UL (ref 0–0.2)
BASOPHILS NFR BLD AUTO: 1 %
BILIRUB SERPL-MCNC: 0.2 MG/DL
BUN SERPL-MCNC: 21.1 MG/DL (ref 6–20)
CALCIUM SERPL-MCNC: 9.9 MG/DL (ref 8.6–10)
CHLORIDE SERPL-SCNC: 99 MMOL/L (ref 98–107)
CREAT SERPL-MCNC: 1.07 MG/DL (ref 0.67–1.17)
D DIMER PPP FEU-MCNC: 0.51 UG/ML FEU (ref 0–0.5)
DEPRECATED HCO3 PLAS-SCNC: 28 MMOL/L (ref 22–29)
DIASTOLIC BLOOD PRESSURE - MUSE: NORMAL MMHG
DIASTOLIC BLOOD PRESSURE - MUSE: NORMAL MMHG
EOSINOPHIL # BLD AUTO: 0.1 10E3/UL (ref 0–0.7)
EOSINOPHIL NFR BLD AUTO: 1 %
ERYTHROCYTE [DISTWIDTH] IN BLOOD BY AUTOMATED COUNT: 13.2 % (ref 10–15)
GFR SERPL CREATININE-BSD FRML MDRD: >90 ML/MIN/1.73M2
GLUCOSE SERPL-MCNC: 91 MG/DL (ref 70–99)
HCT VFR BLD AUTO: 40.5 % (ref 40–53)
HGB BLD-MCNC: 13.3 G/DL (ref 13.3–17.7)
HOLD SPECIMEN: NORMAL
HOLD SPECIMEN: NORMAL
IMM GRANULOCYTES # BLD: 0 10E3/UL
IMM GRANULOCYTES NFR BLD: 0 %
INTERPRETATION ECG - MUSE: NORMAL
INTERPRETATION ECG - MUSE: NORMAL
LYMPHOCYTES # BLD AUTO: 1.4 10E3/UL (ref 0.8–5.3)
LYMPHOCYTES NFR BLD AUTO: 16 %
MAGNESIUM SERPL-MCNC: 2 MG/DL (ref 1.7–2.3)
MCH RBC QN AUTO: 31.7 PG (ref 26.5–33)
MCHC RBC AUTO-ENTMCNC: 32.8 G/DL (ref 31.5–36.5)
MCV RBC AUTO: 97 FL (ref 78–100)
MONOCYTES # BLD AUTO: 0.6 10E3/UL (ref 0–1.3)
MONOCYTES NFR BLD AUTO: 7 %
NEUTROPHILS # BLD AUTO: 6.5 10E3/UL (ref 1.6–8.3)
NEUTROPHILS NFR BLD AUTO: 75 %
NRBC # BLD AUTO: 0 10E3/UL
NRBC BLD AUTO-RTO: 0 /100
NT-PROBNP SERPL-MCNC: 418 PG/ML (ref 0–450)
P AXIS - MUSE: 32 DEGREES
P AXIS - MUSE: 74 DEGREES
PLATELET # BLD AUTO: 330 10E3/UL (ref 150–450)
POTASSIUM SERPL-SCNC: 5.1 MMOL/L (ref 3.4–5.3)
PR INTERVAL - MUSE: 136 MS
PR INTERVAL - MUSE: 142 MS
PROT SERPL-MCNC: 7.1 G/DL (ref 6.4–8.3)
QRS DURATION - MUSE: 86 MS
QRS DURATION - MUSE: 90 MS
QT - MUSE: 376 MS
QT - MUSE: 402 MS
QTC - MUSE: 384 MS
QTC - MUSE: 387 MS
R AXIS - MUSE: 19 DEGREES
R AXIS - MUSE: 37 DEGREES
RBC # BLD AUTO: 4.19 10E6/UL (ref 4.4–5.9)
SODIUM SERPL-SCNC: 138 MMOL/L (ref 136–145)
SYSTOLIC BLOOD PRESSURE - MUSE: NORMAL MMHG
SYSTOLIC BLOOD PRESSURE - MUSE: NORMAL MMHG
T AXIS - MUSE: 40 DEGREES
T AXIS - MUSE: 60 DEGREES
TROPONIN T BLD-MCNC: 0 UG/L
TROPONIN T SERPL HS-MCNC: 19 NG/L
VENTRICULAR RATE- MUSE: 56 BPM
VENTRICULAR RATE- MUSE: 63 BPM
WBC # BLD AUTO: 8.7 10E3/UL (ref 4–11)

## 2023-08-22 PROCEDURE — 71275 CT ANGIOGRAPHY CHEST: CPT

## 2023-08-22 PROCEDURE — 84484 ASSAY OF TROPONIN QUANT: CPT

## 2023-08-22 PROCEDURE — 93005 ELECTROCARDIOGRAM TRACING: CPT | Performed by: EMERGENCY MEDICINE

## 2023-08-22 PROCEDURE — 99285 EMERGENCY DEPT VISIT HI MDM: CPT | Mod: 25 | Performed by: EMERGENCY MEDICINE

## 2023-08-22 PROCEDURE — 71275 CT ANGIOGRAPHY CHEST: CPT | Mod: 26 | Performed by: RADIOLOGY

## 2023-08-22 PROCEDURE — 82310 ASSAY OF CALCIUM: CPT | Performed by: EMERGENCY MEDICINE

## 2023-08-22 PROCEDURE — 83880 ASSAY OF NATRIURETIC PEPTIDE: CPT | Performed by: EMERGENCY MEDICINE

## 2023-08-22 PROCEDURE — 83735 ASSAY OF MAGNESIUM: CPT | Performed by: EMERGENCY MEDICINE

## 2023-08-22 PROCEDURE — 84484 ASSAY OF TROPONIN QUANT: CPT | Performed by: EMERGENCY MEDICINE

## 2023-08-22 PROCEDURE — 85014 HEMATOCRIT: CPT | Performed by: EMERGENCY MEDICINE

## 2023-08-22 PROCEDURE — 36415 COLL VENOUS BLD VENIPUNCTURE: CPT | Performed by: EMERGENCY MEDICINE

## 2023-08-22 PROCEDURE — 99284 EMERGENCY DEPT VISIT MOD MDM: CPT | Mod: 25 | Performed by: EMERGENCY MEDICINE

## 2023-08-22 PROCEDURE — 71046 X-RAY EXAM CHEST 2 VIEWS: CPT | Mod: 26 | Performed by: RADIOLOGY

## 2023-08-22 PROCEDURE — 71046 X-RAY EXAM CHEST 2 VIEWS: CPT

## 2023-08-22 PROCEDURE — 93005 ELECTROCARDIOGRAM TRACING: CPT | Mod: 76 | Performed by: EMERGENCY MEDICINE

## 2023-08-22 PROCEDURE — 93010 ELECTROCARDIOGRAM REPORT: CPT | Performed by: EMERGENCY MEDICINE

## 2023-08-22 PROCEDURE — 250N000013 HC RX MED GY IP 250 OP 250 PS 637: Performed by: EMERGENCY MEDICINE

## 2023-08-22 PROCEDURE — 85379 FIBRIN DEGRADATION QUANT: CPT | Performed by: EMERGENCY MEDICINE

## 2023-08-22 PROCEDURE — 250N000011 HC RX IP 250 OP 636: Performed by: EMERGENCY MEDICINE

## 2023-08-22 RX ORDER — IOPAMIDOL 755 MG/ML
68 INJECTION, SOLUTION INTRAVASCULAR ONCE
Status: COMPLETED | OUTPATIENT
Start: 2023-08-22 | End: 2023-08-22

## 2023-08-22 RX ORDER — ASPIRIN 81 MG/1
324 TABLET, CHEWABLE ORAL ONCE
Status: COMPLETED | OUTPATIENT
Start: 2023-08-22 | End: 2023-08-22

## 2023-08-22 RX ADMIN — IOPAMIDOL 68 ML: 755 INJECTION, SOLUTION INTRAVENOUS at 21:55

## 2023-08-22 RX ADMIN — ASPIRIN 81 MG CHEWABLE TABLET 324 MG: 81 TABLET CHEWABLE at 19:16

## 2023-08-22 ASSESSMENT — ACTIVITIES OF DAILY LIVING (ADL)
ADLS_ACUITY_SCORE: 33
ADLS_ACUITY_SCORE: 35
ADLS_ACUITY_SCORE: 35

## 2023-08-22 NOTE — ED TRIAGE NOTES
Pt arrives ambulatory to triage c/o CP that started last night 1900. Couldn't sleep. Describes chest pressure and tightness and unable to catch his breath. No alleviating factors. No cardiac hx only HTN     Triage Assessment       Row Name 08/22/23 3184       Triage Assessment (Adult)    Airway WDL WDL       Respiratory WDL    Respiratory WDL WDL       Skin Circulation/Temperature WDL    Skin Circulation/Temperature WDL WDL       Cardiac WDL    Cardiac WDL X;chest pain       Chest Pain Assessment    Chest Pain Location anterior chest, left;shoulder, left    Chest Pain Radiation shoulder    Associated Signs/Symptoms anxiety       Peripheral/Neurovascular WDL    Peripheral Neurovascular WDL WDL       Cognitive/Neuro/Behavioral WDL    Cognitive/Neuro/Behavioral WDL WDL

## 2023-08-23 NOTE — ED PROVIDER NOTES
ED Provider Note  [facility]      History     Chief Complaint   Patient presents with    Chest Pain    Shortness of Breath     HPI  Marshall Yoon is a 32 year old male PMH of anxiety, alcohol abuse, hypertension, tobacco abuse, heroin abuse who is here for evaluation of chest pain, SOB.  He states that his symptoms began early yesterday morning.  It has been consistent since then.  He feels that it is a left-sided chest pressure as if the weight is on his chest.  He also feels as if he has a racing heart and it is pounding.  The pain is an 8 out of 10.  It radiates down his left arm into his left jaw.  It does not radiate to his back.  Chest pain is associated with shortness of breath.  It is not pleuritic in nature.  Walking makes his shortness of breath better.  Resting makes his shortness of breath worse.  He states that he feels chills.  He has had a couple episodes of nausea and vomiting and sweating since yesterday.  Denies hemoptysis.  He denies any personal cardiac history.  He denies any family history of cardiac illness at a young age or collagen elastin disorder.    He has a slight tingling of his left arm.  No numbness or weakness.  He denies any headache, vision changes, lower extremity weakness.  He denies any lower extremity edema, calf tenderness.  He denies any estrogen use, recent long car rides or plane rides, recent hospitalizations or surgeries.  He has never had a blood clot before.  He is not on anticoagulation.    He has a history of smoking tobacco since he was 21 years old.  He quit 3 years ago.  He does have a history of hypertension, he states that he takes blood pressure medicine regularly.  He checks his blood pressure at home regularly and it is typically in the systolic 180s.  He took an aspirin earlier this morning.  He denies any drug use.        Past Medical History  Past Medical History:   Diagnosis Date    Anxiety      Past Surgical History:    Procedure Laterality Date    EXTRACTION(S) DENTAL N/A 1/13/2022    Procedure: EXTRACTION, TOOTH 17 and 18 and 32 with incision and drainage, extraoral left;  Surgeon: Saleem Horn DDS;  Location: [facility] OR     cloNIDine (CATAPRES) 0.1 MG tablet  divalproex sodium delayed-release (DEPAKOTE) 500 MG DR tablet  multivitamin w/minerals (THERA-VIT-M) tablet      No Known Allergies  Family History  No family history on file.  Social History   Social History     Tobacco Use    Smoking status: Every Day     Packs/day: 0.25     Types: Cigarettes    Smokeless tobacco: Never   Substance Use Topics    Alcohol use: No         A medically appropriate review of systems was performed with pertinent positives and negatives noted in the HPI, and all other systems negative.    Physical Exam   BP: (!) 182/97  Pulse: 71  Temp: 98.7  F (37.1  C)  Resp: 16  Height: 182.9 cm (6')  Weight: 93 kg (205 lb)  SpO2: 100 %  Physical Exam  General: No acute distress.  Mildly anxious.  Nondiaphoretic.  HENT: Normocephalic and atraumatic.   Eyes: EOMI. Conjunctivae normal.   Cardiovascular: Normal rate and regular rhythm.  Normal heart sounds. No murmur heard.  Reproducible left chest wall tenderness.  Pulmonary: No respiratory distress. Normal breath sounds.   Abdominal: There is no distension. Abdomen is soft. There is no mass. There is no abdominal tenderness.   Musculoskeletal: No swelling or tenderness. Moving all extremities spontaneously.  No calf tenderness.  Negative Homans' sign.  Skin: Warm and dry  Neurological: No focal deficit present.   Mood and Affect: Mood normal.       ED Course, Procedures, & Data      Procedures       Results for orders placed or performed during the hospital encounter of 08/22/23   EKG 12 lead     Status: None (Preliminary result)   Result Value Ref Range    Systolic Blood Pressure  mmHg    Diastolic Blood Pressure  mmHg    Ventricular Rate 63 BPM    Atrial Rate 63 BPM    NE Interval 142 ms    QRS Duration 86 ms      ms    QTc 384 ms    P Axis 74 degrees    R AXIS 37 degrees    T Axis 60 degrees    Interpretation ECG       Sinus rhythm  Possible Left atrial enlargement  Anterior infarct , possibly acute  ** ** ACUTE MI / STEMI ** **  Abnormal ECG       Medications - No data to display  Labs Ordered and Resulted from Time of ED Arrival to Time of ED Departure - No data to display  No orders to display          Critical care was not performed.     Medical Decision Making  The patient's presentation was of moderate complexity (a chronic illness mild to moderate exacerbation, progression, or side effect of treatment).    The patient's evaluation involved:  review of external note(s) from 3+ sources (see separate area of note for details)  ordering and/or review of 3+ test(s) in this encounter (see separate area of note for details)  review of 3+ test result(s) ordered prior to this encounter (see separate area of note for details)    The patient's management necessitated moderate risk (prescription drug management including medications given in the ED).    Assessment & Plan    Patient arrives to the emergency department for persistent chest pain that began early yesterday morning.  Initial blood pressure was 182/97.  Patient appears anxious however not diaphoretic.  He does have some reproducible left chest wall tenderness.    Differential is ACS, pneumothorax, pulmonary embolism, arrhythmia, electrolyte abnormalities, costochondritis.  Patient denies any drug use, thus likely not cocaine induced.    ECG with normal sinus rhythm, T wave elevation of the anterior lateral leads without any inferior reciprocal changes.  Troponin not elevated.  D-dimer 1.51.  CT PE negative for pulmonary embolism or aortic dissection.  Repeat ECG is normal sinus rhythm without any evolution of his T wave elevation.    On reevaluation, he is doing well, I discussed results with him.  He is stable for discharge and follow-up with his primary  care doctor for any additional outpatient work-up.    I have reviewed the nursing notes. I have reviewed the findings, diagnosis, plan and need for follow up with the patient.    New Prescriptions    No medications on file       Final diagnoses:   None        Abril Bunn MD  08/23/23 042

## 2023-08-23 NOTE — DISCHARGE INSTRUCTIONS
Continue taking your blood pressure medication regularly.  If you are able, check your blood pressure once daily.  Check it after you have been sitting for a while.  Your legs uncrossed and your colon.  Keep a journal of your blood pressure.  Make an appointment with your primary care doctor to follow-up on your blood pressure medications to see if they need to be adjusted.

## 2024-07-14 ENCOUNTER — HEALTH MAINTENANCE LETTER (OUTPATIENT)
Age: 34
End: 2024-07-14

## 2025-02-07 NOTE — OP NOTE
Oral & Maxillofacial Surgery Operative Note      PROCEDURE:   - Incision and drainage of left submandibular and pterygomandibular space abscess  - Extraction #17, 18 and32     PRE-OPERATIVE DIAGNOSIS:   - Submandibular and pterygomandibular space absces  - Dental caries      POST-OPERATIVE DIAGNOSIS:  - Same     STAFF SURGEON: Saleem Horn DDS, MD, FACS  RESIDENT SURGEON: Jacob Thompson DDS  : none    ANESTHESIA: General     ESTIMATED BLOOD LOSS: 10cc     SPECIMENS:  Purulent material from left submandibular abscess       COMPLICATIONS: none     DRAINS: Two 1/4 penrose drain to left neck     IMPLANTS:  None     INDICATIONS FOR PROCEDURE:   Patient is a 31 year old male with history of BAO admitted with left submandibular and pterygomandibular space abscess secondary to carious #17 and 18. Patient reports swelling started in the beginning of January but it was on 1/10 that swelling progressively worsened. He has an appointment with [place] [facility] on Friday 1/14 but with worsening swelling, he presented to the ED. CT Facial bones was taken at [facility] on 1/10 and re-taken at ED today due to inability to obtain images/report from outside source. Afebrile upon ED presentation but with leukocytosis. Was started on PO clindamycin yesterday 1/11 but reports no improvement. Started on IV clindamycin in ED. Denies f/v/c, dyspnea, and chest pain but endorses mild dysphagia and odynophagia. A plan was made for incision and drainage of his left submandibular and pterygomandibular abscess, extraction of #17, 18, 32. The planned procedure, risks, benefits, alternatives including no treatment were discussed in detail with the patient. These risks include but are not limited to pain, bleeding, swelling, need for additional surgery, paresthesia of V3 or lingual nerve distributions. Written informed consent obtained.     DESCRIPTION OF PROCEDURE:   The patient was transferred to the operating room by  anesthesia. General anesthesia was administered and the patient was orally intubated. The patient's care was given to the OMFS team for the procedure part. The patient was prepped and draped in standard OMFS fashion for a extraoral incision  procedure. A time out was performed verifying patient, procedure, laterality. Vitals, associated labs and imaging reviewed.     Attention to left neck. A 2cm incision in a left neck crease was marked. 7cc of 0.5% marcaine w/ epi given in subcuaneous plane and as bilateral inferior alveolar nerve blocks and long buccal blocks. #15 used to make incision through skin. Tonsils used to bluntly dissect to inferior border of mandible and into left submandibular and pterygomandibular spaces. 8ml of purulence obtained, this was swabbed and sent for cultures. #17 and 18 were elevated and delivered with forceps. Distobuccal release made from #32. #9 used to elevate buccal flap. Handpiece used to create buccal trough and section #32. #32 mesial and distal halves elevated and delivered with forceps. All incisions irrigated with copious sterile saline. Closed #32 site with 3-0 chromic gut. Two 1/4 inch penrose drains placed. One into the submandibular space and one into the pterygomandibular space. These were secured with 2-0 silk.     At the conclusion of the procedure I was told by the OR nursing staff that all needle, sponge and instrument counts were found to be correct and there were no intraoperative complications noted.       The patient was turned over to the Anesthesia Service and was awakened in the OR and transferred stable to the PACU.     Attending surgeon was present for the entirety of the procedure.     Jacob Thompson DDS  OMFS, PGY-4           .

## 2025-07-19 ENCOUNTER — HEALTH MAINTENANCE LETTER (OUTPATIENT)
Age: 35
End: 2025-07-19

## (undated) DEVICE — GLOVE PROTEXIS POWDER FREE 8.5 ORTHOPEDIC 2D73ET85

## (undated) DEVICE — SUCTION MANIFOLD NEPTUNE 2 SYS 4 PORT 0702-020-000

## (undated) DEVICE — LINEN TOWEL PACK X5 5464

## (undated) DEVICE — PREP POVIDONE IODINE SOLUTION 10% 4OZ

## (undated) DEVICE — DECANTER VIAL 2006S

## (undated) DEVICE — SU CHROMIC 3-0 FS-2 27" 636

## (undated) DEVICE — LINEN TOWEL PACK X6 WHITE 5487

## (undated) DEVICE — TEST TUBE W/SCREW CAP 17361

## (undated) DEVICE — PAD CHUX UNDERPAD 23X24" 7136

## (undated) DEVICE — SUCTION TIP YANKAUER W/O VENT K86

## (undated) DEVICE — SYR EAR BULB 3OZ 0035830

## (undated) DEVICE — SOL NACL 0.9% IRRIG 1000ML BOTTLE 2F7124

## (undated) DEVICE — TOOTHBRUSH ADULT NON STERILE MDS136850

## (undated) DEVICE — ESU ELEC BLADE 2.75" COATED/INSULATED E1455

## (undated) DEVICE — DRSG GAUZE 4X4" TRAY 6939

## (undated) DEVICE — DRAIN PENROSE 3/8X18" LATEX 30416-038

## (undated) DEVICE — SU SILK 2-0 SH 30" K833H

## (undated) DEVICE — SOL WATER IRRIG 1000ML BOTTLE 2F7114

## (undated) RX ORDER — BUPIVACAINE HYDROCHLORIDE AND EPINEPHRINE 5; 5 MG/ML; UG/ML
INJECTION, SOLUTION EPIDURAL; INTRACAUDAL; PERINEURAL
Status: DISPENSED
Start: 2022-01-13

## (undated) RX ORDER — ONDANSETRON 2 MG/ML
INJECTION INTRAMUSCULAR; INTRAVENOUS
Status: DISPENSED
Start: 2022-01-13

## (undated) RX ORDER — OXYCODONE HYDROCHLORIDE 5 MG/1
TABLET ORAL
Status: DISPENSED
Start: 2022-01-13

## (undated) RX ORDER — HYDROMORPHONE HCL IN WATER/PF 6 MG/30 ML
PATIENT CONTROLLED ANALGESIA SYRINGE INTRAVENOUS
Status: DISPENSED
Start: 2022-01-13

## (undated) RX ORDER — FENTANYL CITRATE 50 UG/ML
INJECTION, SOLUTION INTRAMUSCULAR; INTRAVENOUS
Status: DISPENSED
Start: 2022-01-13

## (undated) RX ORDER — CHLORHEXIDINE GLUCONATE ORAL RINSE 1.2 MG/ML
SOLUTION DENTAL
Status: DISPENSED
Start: 2022-01-13